# Patient Record
Sex: MALE | Race: WHITE | Employment: FULL TIME | URBAN - NONMETROPOLITAN AREA
[De-identification: names, ages, dates, MRNs, and addresses within clinical notes are randomized per-mention and may not be internally consistent; named-entity substitution may affect disease eponyms.]

---

## 2017-06-08 ENCOUNTER — HISTORY (OUTPATIENT)
Dept: FAMILY MEDICINE | Facility: OTHER | Age: 42
End: 2017-06-08

## 2017-06-08 ENCOUNTER — OFFICE VISIT - GICH (OUTPATIENT)
Dept: FAMILY MEDICINE | Facility: OTHER | Age: 42
End: 2017-06-08

## 2017-06-08 DIAGNOSIS — K64.0 FIRST DEGREE HEMORRHOIDS: ICD-10-CM

## 2017-06-08 ASSESSMENT — PATIENT HEALTH QUESTIONNAIRE - PHQ9: SUM OF ALL RESPONSES TO PHQ QUESTIONS 1-9: 0

## 2017-12-27 NOTE — PROGRESS NOTES
Patient Information     Patient Name MRN Sex Sunday Kaba [id number] Male 1975      Progress Notes by Jimenez Fournier MD at 2017 11:30 AM     Author:  Jimenez Fournier MD Service:  (none) Author Type:  Physician     Filed:  2017  7:53 AM Encounter Date:  2017 Status:  Signed     :  Jimeenz Fournier MD (Physician)            SUBJECTIVE:    Sunday Ramírez is a 42 y.o. male who presents for hemorrhoid    HPI Comments: Patient arrives here for rectal pain. States is been going on for couple days. He's been using Preparation H with short term relief      No Known Allergies,   Family History       Problem   Relation Age of Onset     Other  Mother      Emphyzema      and   No current outpatient prescriptions on file prior to visit.     No current facility-administered medications on file prior to visit.        REVIEW OF SYSTEMS:  ROS    OBJECTIVE:  /60  Temp 97.8  F (36.6  C) (Temporal)  Wt 115.8 kg (255 lb 6.4 oz)  BMI 36.65 kg/m2    EXAM:   Physical Exam   Constitutional: He is well-developed, well-nourished, and in no distress.   Abdominal:   Patient does have an external hemorrhoid that soft does not appear to be clotted. I do not feel any fluctuant mass that would be concerning for a rectal abscess   Skin: Skin is warm.       ASSESSMENT/PLAN:    ICD-10-CM    1. First degree hemorrhoids K64.0 hydrocortisone 2.5% cream        Plan:  Recommended hydrocortisone cream. If getting worse follow-up. Currently doubt a rectal abscess exists.

## 2017-12-30 NOTE — NURSING NOTE
Patient Information     Patient Name MRN Sunday Joseph [id number] Male 1975      Nursing Note by Taya Rice at 2017 11:30 AM     Author:  Taya Rice Service:  (none) Author Type:  (none)     Filed:  2017 11:24 AM Encounter Date:  2017 Status:  Signed     :  Taya Rice            Patient here for hemorrhoids that started on Monday. Taya Rice LPN .......................2017  11:21 AM

## 2018-01-25 ENCOUNTER — DOCUMENTATION ONLY (OUTPATIENT)
Dept: FAMILY MEDICINE | Facility: OTHER | Age: 43
End: 2018-01-25

## 2018-01-25 PROBLEM — F41.1 ANXIETY STATE: Status: ACTIVE | Noted: 2018-01-25

## 2018-01-25 PROBLEM — K64.0 FIRST DEGREE HEMORRHOIDS: Status: ACTIVE | Noted: 2017-06-08

## 2018-01-27 VITALS — DIASTOLIC BLOOD PRESSURE: 60 MMHG | SYSTOLIC BLOOD PRESSURE: 130 MMHG | TEMPERATURE: 97.8 F | WEIGHT: 255.4 LBS

## 2018-01-31 ASSESSMENT — PATIENT HEALTH QUESTIONNAIRE - PHQ9: SUM OF ALL RESPONSES TO PHQ QUESTIONS 1-9: 0

## 2018-02-14 ENCOUNTER — OFFICE VISIT (OUTPATIENT)
Dept: FAMILY MEDICINE | Facility: OTHER | Age: 43
End: 2018-02-14
Attending: FAMILY MEDICINE
Payer: COMMERCIAL

## 2018-02-14 VITALS
BODY MASS INDEX: 34.26 KG/M2 | DIASTOLIC BLOOD PRESSURE: 64 MMHG | SYSTOLIC BLOOD PRESSURE: 126 MMHG | HEART RATE: 80 BPM | WEIGHT: 238.8 LBS

## 2018-02-14 DIAGNOSIS — H90.0 CONDUCTIVE HEARING LOSS, BILATERAL: ICD-10-CM

## 2018-02-14 DIAGNOSIS — H93.13 TINNITUS OF BOTH EARS: Primary | ICD-10-CM

## 2018-02-14 PROBLEM — K64.0 FIRST DEGREE HEMORRHOIDS: Status: RESOLVED | Noted: 2017-06-08 | Resolved: 2018-02-14

## 2018-02-14 PROCEDURE — 99213 OFFICE O/P EST LOW 20 MIN: CPT | Performed by: FAMILY MEDICINE

## 2018-02-14 PROCEDURE — G0463 HOSPITAL OUTPT CLINIC VISIT: HCPCS

## 2018-02-14 NOTE — MR AVS SNAPSHOT
"              After Visit Summary   2/14/2018    Sunday Ramírez    MRN: [id number]           Patient Information     Date Of Birth          1975        Visit Information        Provider Department      2/14/2018 10:45 AM Jimenez Fournier MD [facility]        Today's Diagnoses     Tinnitus of both ears    -  1    Conductive hearing loss, bilateral           Follow-ups after your visit        Additional Services     AUDIOLOGY ADULT REFERRAL       Your provider has referred you to: per pt      Hearing loss and ringing                  Who to contact     If you have questions or need follow up information about today's clinic visit or your schedule please contact [facility] directly at [phone].  Normal or non-critical lab and imaging results will be communicated to you by Saiseihart, letter or phone within 4 business days after the clinic has received the results. If you do not hear from us within 7 days, please contact the clinic through Saiseihart or phone. If you have a critical or abnormal lab result, we will notify you by phone as soon as possible.  Submit refill requests through Viacore or call your pharmacy and they will forward the refill request to us. Please allow 3 business days for your refill to be completed.          Additional Information About Your Visit        MyChart Information     Viacore lets you send messages to your doctor, view your test results, renew your prescriptions, schedule appointments and more. To sign up, go to [email or web address] . Click on \"Log in\" on the left side of the screen, which will take you to the Welcome page. Then click on \"Sign up Now\" on the right side of the page.     You will be asked to enter the access code listed below, as well as some personal information. Please follow the directions to create your username and password.     Your access code is: [email or web address]  Expires: 5/15/2018 11:04 AM     Your access code will "  in 90 days. If you need help or a new code, please call your [facility] clinic or [phone].        Care EveryWhere ID     This is your Care EveryWhere ID. This could be used by other organizations to access your [facility] medical records  [id number]        Your Vitals Were     Pulse BMI (Body Mass Index)                80 34.26 kg/m2           Blood Pressure from Last 3 Encounters:   18 126/64   17 130/60   08/25/15 124/80    Weight from Last 3 Encounters:   18 238 lb 12.8 oz (108.3 kg)   17 255 lb 6.4 oz (115.8 kg)   08/25/15 254 lb (115.2 kg)              We Performed the Following     AUDIOLOGY ADULT REFERRAL        Primary Care Provider Office Phone # Fax #    [facility] [phone] [address]  [facility] [address]        Equal Access to Services     PRESLEY DOMÍNGUEZ : Hadii sylvain Garza, waaxda loki, qaybta kaalmada shama, ramiro villatoro . So [facility] [phone].    ATENCIÓN: Si maria luzla michelle, tiene a graham disposición servicios gratuitos de asistencia lingüística. Llame al [phone].    We comply with applicable federal civil rights laws and [place] laws. We do not discriminate on the basis of race, color, national origin, age, disability, sex, sexual orientation, or gender identity.            Thank you!     Thank you for choosing [facility] AND [facility]  for your care. Our goal is always to provide you with excellent care. Hearing back from our patients is one way we can continue to improve our services. Please take a few minutes to complete the written survey that you may receive in the mail after your visit with us. Thank you!             Your Updated Medication List - Protect others around you: Learn how to safely use, store and throw away your medicines at www.disposemymeds.org.      Notice  As of 2018 11:04 AM    You have not been prescribed any medications.

## 2018-02-14 NOTE — NURSING NOTE
Patient here for consult for referral for hearing aides. He has had hearing trouble and ringing in his ears since he was little. Taya Rice LPN .......................2/14/2018  10:42 AM  Audiology Screening  Right Ear Frequencies 500 and 1000 were heard at  20 dcbl   Left Ear Frequencies: 500 and 1000 were hears at  20 dcbl   Test performed by: Taya Rice LPN .......................2/14/2018  10:51 AM   on 2/14/2018

## 2018-02-14 NOTE — PROGRESS NOTES
SUBJECTIVE:   Sunday Ramírez is a 42 year old male who presents to clinic today for the following health issues:      HPI Comments: Pt reports ringing in ear for years  [place]  And exposed to loud noises       Problem list and histories reviewed & adjusted, as indicated.  Additional history: as documented        Patient Active Problem List   Diagnosis     Anxiety state     Right carpal tunnel syndrome     First degree hemorrhoids     Tobacco use disorder     Trigger finger, left     No past surgical history on file.    Social History   Substance Use Topics     Smoking status: Former Smoker     Packs/day: 1.00     Types: Cigarettes     Quit date: 1/1/2015     Smokeless tobacco: Current User     Types: Chew     Alcohol use No     Family History   Problem Relation Age of Onset     Other - See Comments Mother      Emphyzema         No current outpatient prescriptions on file.     No Known Allergies  Recent Labs   Lab Test  01/03/13   1214   CR  0.69*   GFRESTBLACK  >60        ROS:      OBJECTIVE:     /64  Pulse 80  Wt 238 lb 12.8 oz (108.3 kg)  BMI 34.26 kg/m2  Body mass index is 34.26 kg/(m^2).   GENERAL: healthy, alert and no distress  HENT: ear canals and TM's normal, nose and mouth without ulcers or lesions  MS: no gross musculoskeletal defects noted, no edema    none     ASSESSMENT:       PLAN:   1. Tinnitus of both ears    - AUDIOLOGY ADULT REFERRAL    2. Conductive hearing loss, bilateral                Jimenez Fournier MD  [facility]

## 2018-07-24 NOTE — PROGRESS NOTES
Patient Information     Patient Name  Sunday Ramírez MRN  [id number] Sex  Male   1975      Letter by Jimenez Fournier MD at      Author:  Jimenez Fournier MD Service:  (none) Author Type:  (none)    Filed:   Encounter Date:  2017 Status:  (Other)           Sunday Ramírez  [address]  [facility] [address]          2017      CERTIFICATE TO RETURN TO WORK OR SCHOOL      Sunday Ramírez was seen on  2017 and is able to return to work          Jimenez Fournier MD ....................  2017   11:34 AM

## 2019-02-12 ENCOUNTER — OFFICE VISIT (OUTPATIENT)
Dept: OTOLARYNGOLOGY | Facility: OTHER | Age: 44
End: 2019-02-12
Attending: OTOLARYNGOLOGY
Payer: COMMERCIAL

## 2019-02-12 DIAGNOSIS — H90.3 SENSORY HEARING LOSS, BILATERAL: Primary | ICD-10-CM

## 2019-02-12 PROCEDURE — G0463 HOSPITAL OUTPT CLINIC VISIT: HCPCS

## 2019-02-14 NOTE — PROGRESS NOTES
CATESHAHABY A    43 Y old Male, : 1975    Account Number: [id number]     , [address] [phone]    Home: [phone]     Guarantor: JONH ESPOSITO Insurance: [employer] MEDICAID Payer ID: [id number]   PCP: [facility]   Appointment Facility: [facility]      2019 Adrián Cowan MD       Reason for Appointment   1. HEARING EVALUATION   2. Hearing loss   History of Present Illness   HPI:   The patient is a 43-year-old male who comes to the office today for evaluation of his ears and hearing. 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 He has had no previous otologic surgery. He denies recurring ear infections. He has no history consistent with ototoxicity. He has a positive family history of hearing loss in his father and paternal grandfather. In addition to hearing loss, he complains of nearly continuous tinnitus.   Examination   General Examination:  External auditory canals and TMs are clear bilaterally   Remainder of the head neck exam is unremarkable   Audiogram-There is a significant high-frequency hearing loss bilaterally. The speech reception thresholds are 35 decibels. He has mildly depressed discrimination scores.     Assessments   1. Sensorineural hearing loss (SNHL) of both ears - H90.3 (Primary)   Treatment   1. Others   Notes: The patient was counseled that hearing aids would clearly be of some benefit to him. He was returned to our audiologist to discuss his options. He will follow up with me as needed.  Procedures  [ ].          Follow Up   prn               Electronically signed by ADRIÁN COWAN MD on 2019 at 08:21 AM CST   Sign off status: Completed          [facility]  [address] [address]  Tel: [phone]  Fax:           Patient: JONH ESPOSITO : 1975 Progress Note: Adrián Cowan MD 2019      Note generated by [employer] EMR/PM  Software ([email or web address])

## 2019-02-27 ENCOUNTER — HOSPITAL ENCOUNTER (OUTPATIENT)
Dept: CT IMAGING | Facility: HOSPITAL | Age: 44
Discharge: HOME | End: 2019-02-27
Attending: FAMILY MEDICINE | Admitting: FAMILY MEDICINE
Payer: COMMERCIAL

## 2019-02-27 DIAGNOSIS — R10.32 LLQ ABDOMINAL PAIN: ICD-10-CM

## 2019-02-27 PROCEDURE — 74177 CT ABD & PELVIS W/CONTRAST: CPT | Mod: TC

## 2019-02-27 PROCEDURE — 25500064 ZZH RX 255 OP 636: Performed by: RADIOLOGY

## 2019-02-27 RX ORDER — IOPAMIDOL 612 MG/ML
100 INJECTION, SOLUTION INTRAVASCULAR ONCE
Status: COMPLETED | OUTPATIENT
Start: 2019-02-27 | End: 2019-02-27

## 2019-02-27 RX ADMIN — IOPAMIDOL 100 ML: 612 INJECTION, SOLUTION INTRAVENOUS at 11:35

## 2019-02-27 RX ADMIN — DIATRIZOATE MEGLUMINE AND DIATRIZOATE SODIUM 30 ML: 660; 100 SOLUTION ORAL; RECTAL at 11:35

## 2019-03-12 ENCOUNTER — APPOINTMENT (OUTPATIENT)
Dept: OTOLARYNGOLOGY | Facility: OTHER | Age: 44
End: 2019-03-12
Attending: NURSE PRACTITIONER
Payer: COMMERCIAL

## 2019-03-25 ENCOUNTER — OFFICE VISIT (OUTPATIENT)
Dept: SURGERY | Facility: OTHER | Age: 44
End: 2019-03-25
Attending: SURGERY
Payer: COMMERCIAL

## 2019-03-25 VITALS
DIASTOLIC BLOOD PRESSURE: 76 MMHG | RESPIRATION RATE: 16 BRPM | TEMPERATURE: 97.2 F | SYSTOLIC BLOOD PRESSURE: 134 MMHG | BODY MASS INDEX: 38.51 KG/M2 | HEART RATE: 92 BPM | OXYGEN SATURATION: 97 % | WEIGHT: 268.4 LBS

## 2019-03-25 DIAGNOSIS — K52.9 COLITIS: Primary | ICD-10-CM

## 2019-03-25 PROCEDURE — 99204 OFFICE O/P NEW MOD 45 MIN: CPT | Performed by: SURGERY

## 2019-03-25 PROCEDURE — G0463 HOSPITAL OUTPT CLINIC VISIT: HCPCS

## 2019-03-25 RX ORDER — BISACODYL 5 MG
TABLET, DELAYED RELEASE (ENTERIC COATED) ORAL
Qty: 2 TABLET | Refills: 0 | Status: ON HOLD | OUTPATIENT
Start: 2019-03-25 | End: 2019-04-10

## 2019-03-25 RX ORDER — POLYETHYLENE GLYCOL 3350, SODIUM CHLORIDE, SODIUM BICARBONATE, POTASSIUM CHLORIDE 420; 11.2; 5.72; 1.48 G/4L; G/4L; G/4L; G/4L
4000 POWDER, FOR SOLUTION ORAL ONCE
Status: DISCONTINUED | OUTPATIENT
Start: 2019-03-25 | End: 2019-03-25

## 2019-03-25 RX ORDER — POLYETHYLENE GLYCOL 3350, SODIUM CHLORIDE, SODIUM BICARBONATE, POTASSIUM CHLORIDE 420; 11.2; 5.72; 1.48 G/4L; G/4L; G/4L; G/4L
4000 POWDER, FOR SOLUTION ORAL ONCE
Qty: 4000 ML | Refills: 0 | Status: ON HOLD | OUTPATIENT
Start: 2019-03-25 | End: 2019-04-10

## 2019-03-25 RX ORDER — BISACODYL 5 MG
5 TABLET, DELAYED RELEASE (ENTERIC COATED) ORAL DAILY PRN
Qty: 2 TABLET | Refills: 0 | Status: SHIPPED | OUTPATIENT
Start: 2019-03-25 | End: 2019-03-25

## 2019-03-25 ASSESSMENT — PAIN SCALES - GENERAL: PAINLEVEL: NO PAIN (0)

## 2019-03-25 NOTE — PROGRESS NOTES
GENERAL SURGERY CONSULTATION NOTE    Sunday Ramírez   [address]  [address]   [place] [address]  43 year old  male    Primary Care Provider:  Primitivo Maxwell      HPI: Sunday Ramírez presents to clinic with recent history of acute abdominal pain.  Patient was seen by his primary doctor in [place], CT scan was significant for colitis and the patient was treated with oral antibiotics for diagnosis of diverticulitis.  Patient said his symptoms improved with the antibiotic.  He is no longer taking the antibiotics.  He now only has some residual mild cramping.  His stools are soft and formed.  He denies constipation.  He was constipated when he had acute left-sided abdominal pain.  Patient denies diarrhea patient denies blood in his stools.  The patient's weight has been stable. Patient denies family history of colon cancer.  He is never had a colonoscopy.  Patient denies family history of inflammatory bowel disease such as Crohn's or ulcerative colitis.  He is now eating a regular diet, which she says includes a lots of processed foods and cheeses.  He does not take a fiber supplement.      REVIEW OF SYSTEMS:    GENERAL: No fevers or chills. Denies fatigue, recent weight loss.  HEENT: No sinus drainage. No changes with vision or hearing. No difficulty swallowing.   LYMPHATICS:  Noswollen nodes in axilla, neck or groin.  CARDIOVASCULAR: Denies chest pain, palpitations and dyspnea on exertion.  PULMONARY: No shortness of breath or cough. No increase in sputum production.  GI: Denies melena, bright red blood in stools. No hematemesis.  : No dysuria or hematuria.  SKIN: No recent rashes or ulcers.   HEMATOLOGY:  No history of easy bruising or bleeding.  ENDOCRINE:  No history of diabetes or thyroid problems.  NEUROLOGY:  No history of seizures or headaches. No motor or sensory changes.         Patient Active Problem List   Diagnosis     Anxiety state     Right carpal tunnel syndrome     Tobacco use disorder     Trigger finger,  left       Past Medical History:   Diagnosis Date     Injury of left forearm     04     Uncomplicated asthma     Hx of asthma       No past surgical history on file.    Family History   Problem Relation Age of Onset     Other - See Comments Mother         Emphyzema       Social History     Social History Narrative    . . Works in [employer].  Interested in mechanics, reading, fishing, running.  Last [place] .  Neg    <Preloaded 1/10/13       Social History     Socioeconomic History     Marital status:      Spouse name: Not on file     Number of children: Not on file     Years of education: Not on file     Highest education level: Not on file   Occupational History     Not on file   Social Needs     Financial resource strain: Not on file     Food insecurity:     Worry: Not on file     Inability: Not on file     Transportation needs:     Medical: Not on file     Non-medical: Not on file   Tobacco Use     Smoking status: Former Smoker     Packs/day: 1.00     Types: Cigarettes     Last attempt to quit: 2015     Years since quittin.2     Smokeless tobacco: Current User     Types: Chew   Substance and Sexual Activity     Alcohol use: No     Drug use: No     Types: Other     Comment: Drug use: No     Sexual activity: Not on file   Lifestyle     Physical activity:     Days per week: Not on file     Minutes per session: Not on file     Stress: Not on file   Relationships     Social connections:     Talks on phone: Not on file     Gets together: Not on file     Attends Restorationist service: Not on file     Active member of club or organization: Not on file     Attends meetings of clubs or organizations: Not on file     Relationship status: Not on file     Intimate partner violence:     Fear of current or ex partner: Not on file     Emotionally abused: Not on file     Physically abused: Not on file     Forced sexual activity: Not on file   Other Topics Concern     Parent/sibling w/ CABG, MI or  angioplasty before 65F 55M? Not Asked   Social History Narrative    . . Works in [employer].  Interested in mechanics, reading, fishing, running.  Last [employer] 2006.  Neg    <Preloaded 1/10/13         No current outpatient medications on file prior to visit.  No current facility-administered medications on file prior to visit.       ALLERGIES/SENSITIVITIES: No Known Allergies    PHYSICAL EXAM:     /76 (BP Location: Right arm, Patient Position: Sitting, Cuff Size: Adult Regular)   Pulse 92   Temp 97.2  F (36.2  C) (Temporal)   Resp 16   Wt 121.7 kg (268 lb 6.4 oz)   SpO2 97%   BMI 38.51 kg/m      General Appearance:   Sitting up in the chair, no apparent distress  HEENT: Pupils are equal and reactive, no scleral icterus, hearing aids are in place  Heart & CV:  RRR no murmur.  Intact distal pulses, good cap refill.  LUNGS: No increased work of breathing.Lugns are CTA B/L, no wheezing or crackles.  Abd: Obese abdomen, soft, nontender, nondistended.  No masses.  No evidence of umbilical or inguinal hernia.  Ext: Normal bulk and tone, no lower extremity edema  Neuro: Alert and oriented, normal speech and mentation    CT scan is significant for a discrete area of inflammation within the descending colon.  There is no evidence of diverticulosis in the area.  There is no evidence of diverticulosis in the sigmoid colon.  There is no evidence of lymphadenopathy in the area of inflammation.  There is no liver lesions.        CONSULTATION ASSESSMENT AND PLAN:    43 year old male with colitis on CT scan which is presumed diverticulitis that improved with oral antibiotics.  I do not see significant burden of diverticulosis on the CT scan however this is the most likely etiology overall.  Other sources of pain could be infectious colitis, inflammatory bowel disease, colon cancer.  For this reason I think colonoscopy is warranted.  The technical details of colonoscopy were discussed with the patient along  with the risks and benefits to include bleeding, perforation, incomplete study.  Patient demonstrated understanding and is willing to proceed.    Schedule colonoscopy at the patient's convenience.        Alexsander Tinoco MD on 3/25/2019 at 10:56 AM

## 2019-03-25 NOTE — H&P (VIEW-ONLY)
GENERAL SURGERY CONSULTATION NOTE    Sunday Ramírez   [address]   [place] [address]  43 year old  male    Primary Care Provider:  Primitivo Maxwell      HPI: Sunday Ramírez presents to clinic with recent history of acute abdominal pain.  Patient was seen by his primary doctor in [place], CT scan was significant for colitis and the patient was treated with oral antibiotics for diagnosis of diverticulitis.  Patient said his symptoms improved with the antibiotic.  He is no longer taking the antibiotics.  He now only has some residual mild cramping.  His stools are soft and formed.  He denies constipation.  He was constipated when he had acute left-sided abdominal pain.  Patient denies diarrhea patient denies blood in his stools.  The patient's weight has been stable. Patient denies family history of colon cancer.  He is never had a colonoscopy.  Patient denies family history of inflammatory bowel disease such as Crohn's or ulcerative colitis.  He is now eating a regular diet, which she says includes a lots of processed foods and cheeses.  He does not take a fiber supplement.      REVIEW OF SYSTEMS:    GENERAL: No fevers or chills. Denies fatigue, recent weight loss.  HEENT: No sinus drainage. No changes with vision or hearing. No difficulty swallowing.   LYMPHATICS:  Noswollen nodes in axilla, neck or groin.  CARDIOVASCULAR: Denies chest pain, palpitations and dyspnea on exertion.  PULMONARY: No shortness of breath or cough. No increase in sputum production.  GI: Denies melena, bright red blood in stools. No hematemesis.  : No dysuria or hematuria.  SKIN: No recent rashes or ulcers.   HEMATOLOGY:  No history of easy bruising or bleeding.  ENDOCRINE:  No history of diabetes or thyroid problems.  NEUROLOGY:  No history of seizures or headaches. No motor or sensory changes.         Patient Active Problem List   Diagnosis     Anxiety state     Right carpal tunnel syndrome     Tobacco use disorder     Trigger finger,  left       Past Medical History:   Diagnosis Date     Injury of left forearm     04     Uncomplicated asthma     Hx of asthma       No past surgical history on file.    Family History   Problem Relation Age of Onset     Other - See Comments Mother         Emphyzema       Social History     Social History Narrative    . . Works in [employer].  Interested in mechanics, reading, fishing, running.  Last [place] .  Neg    <Preloaded 1/10/13       Social History     Socioeconomic History     Marital status:      Spouse name: Not on file     Number of children: Not on file     Years of education: Not on file     Highest education level: Not on file   Occupational History     Not on file   Social Needs     Financial resource strain: Not on file     Food insecurity:     Worry: Not on file     Inability: Not on file     Transportation needs:     Medical: Not on file     Non-medical: Not on file   Tobacco Use     Smoking status: Former Smoker     Packs/day: 1.00     Types: Cigarettes     Last attempt to quit: 2015     Years since quittin.2     Smokeless tobacco: Current User     Types: Chew   Substance and Sexual Activity     Alcohol use: No     Drug use: No     Types: Other     Comment: Drug use: No     Sexual activity: Not on file   Lifestyle     Physical activity:     Days per week: Not on file     Minutes per session: Not on file     Stress: Not on file   Relationships     Social connections:     Talks on phone: Not on file     Gets together: Not on file     Attends Jehovah's witness service: Not on file     Active member of club or organization: Not on file     Attends meetings of clubs or organizations: Not on file     Relationship status: Not on file     Intimate partner violence:     Fear of current or ex partner: Not on file     Emotionally abused: Not on file     Physically abused: Not on file     Forced sexual activity: Not on file   Other Topics Concern     Parent/sibling w/ CABG, MI or  angioplasty before 65F 55M? Not Asked   Social History Narrative    . . Works in [employer].  Interested in mechanics, reading, fishing, running.  Last [place] 2006.  Neg    <Preloaded 1/10/13         No current outpatient medications on file prior to visit.  No current facility-administered medications on file prior to visit.       ALLERGIES/SENSITIVITIES: No Known Allergies    PHYSICAL EXAM:     /76 (BP Location: Right arm, Patient Position: Sitting, Cuff Size: Adult Regular)   Pulse 92   Temp 97.2  F (36.2  C) (Temporal)   Resp 16   Wt 121.7 kg (268 lb 6.4 oz)   SpO2 97%   BMI 38.51 kg/m      General Appearance:   Sitting up in the chair, no apparent distress  HEENT: Pupils are equal and reactive, no scleral icterus, hearing aids are in place  Heart & CV:  RRR no murmur.  Intact distal pulses, good cap refill.  LUNGS: No increased work of breathing.Lugns are CTA B/L, no wheezing or crackles.  Abd: Obese abdomen, soft, nontender, nondistended.  No masses.  No evidence of umbilical or inguinal hernia.  Ext: Normal bulk and tone, no lower extremity edema  Neuro: Alert and oriented, normal speech and mentation    CT scan is significant for a discrete area of inflammation within the descending colon.  There is no evidence of diverticulosis in the area.  There is no evidence of diverticulosis in the sigmoid colon.  There is no evidence of lymphadenopathy in the area of inflammation.  There is no liver lesions.        CONSULTATION ASSESSMENT AND PLAN:    43 year old male with colitis on CT scan which is presumed diverticulitis that improved with oral antibiotics.  I do not see significant burden of diverticulosis on the CT scan however this is the most likely etiology overall.  Other sources of pain could be infectious colitis, inflammatory bowel disease, colon cancer.  For this reason I think colonoscopy is warranted.  The technical details of colonoscopy were discussed with the patient along  with the risks and benefits to include bleeding, perforation, incomplete study.  Patient demonstrated understanding and is willing to proceed.    Schedule colonoscopy at the patient's convenience.        Alexsander Tinoco MD on 3/25/2019 at 10:56 AM

## 2019-03-25 NOTE — TELEPHONE ENCOUNTER
Screening Questions for the Scheduling of Screening Colonoscopies   (If Colonoscopy is diagnostic, Provider should review the chart before scheduling.)  Are you younger than 50 or older than 80?  YES   Do you take aspirin or fish oil?  NO  (if yes, tell patient to stop 1 week prior to Colonoscopy)  Do you take warfarin (Coumadin), clopidogrel (Plavix), apixaban (Eliquis), dabigatram (Pradaxa), rivaroxaban (Xarelto) or any blood thinner? NO   Do you use oxygen at home?  NO   Do you have kidney disease? NO   Are you on dialysis? NO   Have you had a stroke or heart attack in the last year? NO   Have you had a stent in your heart or any blood vessel in the last year? NO   Have you had a transplant of any organ? NO   Have you had a colonoscopy or upper endoscopy (EGD) before? NO          When?    Date of scheduled Colonoscopy. 04/10/2019  Provider  [facility]   Pharmacy [facility]

## 2019-04-10 ENCOUNTER — ANESTHESIA EVENT (OUTPATIENT)
Dept: SURGERY | Facility: OTHER | Age: 44
End: 2019-04-10
Payer: COMMERCIAL

## 2019-04-10 ENCOUNTER — HOSPITAL ENCOUNTER (OUTPATIENT)
Facility: OTHER | Age: 44
Discharge: HOME | End: 2019-04-10
Attending: SURGERY | Admitting: SURGERY
Payer: COMMERCIAL

## 2019-04-10 ENCOUNTER — ANESTHESIA (OUTPATIENT)
Dept: SURGERY | Facility: OTHER | Age: 44
End: 2019-04-10
Payer: COMMERCIAL

## 2019-04-10 VITALS
RESPIRATION RATE: 16 BRPM | WEIGHT: 268 LBS | DIASTOLIC BLOOD PRESSURE: 87 MMHG | TEMPERATURE: 97.5 F | HEIGHT: 70 IN | OXYGEN SATURATION: 97 % | BODY MASS INDEX: 38.37 KG/M2 | SYSTOLIC BLOOD PRESSURE: 128 MMHG | HEART RATE: 68 BPM

## 2019-04-10 DIAGNOSIS — K63.89 COLONIC MASS: Primary | ICD-10-CM

## 2019-04-10 LAB
ALBUMIN SERPL-MCNC: 3.7 G/DL (ref 3.5–5.7)
ALP SERPL-CCNC: 66 U/L (ref 34–104)
ALT SERPL W P-5'-P-CCNC: 27 U/L (ref 7–52)
ANION GAP SERPL CALCULATED.3IONS-SCNC: 6 MMOL/L (ref 3–14)
AST SERPL W P-5'-P-CCNC: 20 U/L (ref 13–39)
BILIRUB SERPL-MCNC: 0.4 MG/DL (ref 0.3–1)
BUN SERPL-MCNC: 16 MG/DL (ref 7–25)
CALCIUM SERPL-MCNC: 9.1 MG/DL (ref 8.6–10.3)
CEA SERPL-MCNC: <3 NG/ML
CHLORIDE SERPL-SCNC: 105 MMOL/L (ref 98–107)
CO2 SERPL-SCNC: 28 MMOL/L (ref 21–31)
CREAT SERPL-MCNC: 1.04 MG/DL (ref 0.7–1.3)
ERYTHROCYTE [DISTWIDTH] IN BLOOD BY AUTOMATED COUNT: 13.5 % (ref 10–15)
GFR SERPL CREATININE-BSD FRML MDRD: 78 ML/MIN/{1.73_M2}
GLUCOSE SERPL-MCNC: 112 MG/DL (ref 70–105)
HCT VFR BLD AUTO: 39.2 % (ref 40–53)
HGB BLD-MCNC: 12.1 G/DL (ref 13.3–17.7)
MCH RBC QN AUTO: 26.5 PG (ref 26.5–33)
MCHC RBC AUTO-ENTMCNC: 30.9 G/DL (ref 31.5–36.5)
MCV RBC AUTO: 86 FL (ref 78–100)
PLATELET # BLD AUTO: 285 10E9/L (ref 150–450)
POTASSIUM SERPL-SCNC: 4 MMOL/L (ref 3.5–5.1)
PROT SERPL-MCNC: 6.5 G/DL (ref 6.4–8.9)
RBC # BLD AUTO: 4.57 10E12/L (ref 4.4–5.9)
SODIUM SERPL-SCNC: 139 MMOL/L (ref 134–144)
WBC # BLD AUTO: 5.6 10E9/L (ref 4–11)

## 2019-04-10 PROCEDURE — 45380 COLONOSCOPY AND BIOPSY: CPT | Performed by: ANESTHESIOLOGY

## 2019-04-10 PROCEDURE — 88341 IMHCHEM/IMCYTCHM EA ADD ANTB: CPT

## 2019-04-10 PROCEDURE — 45380 COLONOSCOPY AND BIOPSY: CPT | Performed by: NURSE ANESTHETIST, CERTIFIED REGISTERED

## 2019-04-10 PROCEDURE — 25000128 H RX IP 250 OP 636: Performed by: NURSE ANESTHETIST, CERTIFIED REGISTERED

## 2019-04-10 PROCEDURE — 25000125 ZZHC RX 250: Performed by: NURSE ANESTHETIST, CERTIFIED REGISTERED

## 2019-04-10 PROCEDURE — 25800030 ZZH RX IP 258 OP 636: Performed by: SURGERY

## 2019-04-10 PROCEDURE — 45380 COLONOSCOPY AND BIOPSY: CPT | Performed by: SURGERY

## 2019-04-10 PROCEDURE — 25000125 ZZHC RX 250: Performed by: SURGERY

## 2019-04-10 PROCEDURE — 88305 TISSUE EXAM BY PATHOLOGIST: CPT

## 2019-04-10 PROCEDURE — 45381 COLONOSCOPY SUBMUCOUS NJX: CPT | Performed by: SURGERY

## 2019-04-10 PROCEDURE — 40000010 ZZH STATISTIC ANES STAT CODE-CRNA PER MINUTE: Performed by: SURGERY

## 2019-04-10 PROCEDURE — 45381 COLONOSCOPY SUBMUCOUS NJX: CPT

## 2019-04-10 PROCEDURE — 82378 CARCINOEMBRYONIC ANTIGEN: CPT | Performed by: SURGERY

## 2019-04-10 PROCEDURE — 36415 COLL VENOUS BLD VENIPUNCTURE: CPT | Performed by: SURGERY

## 2019-04-10 PROCEDURE — 80053 COMPREHEN METABOLIC PANEL: CPT | Performed by: SURGERY

## 2019-04-10 PROCEDURE — 25000132 ZZH RX MED GY IP 250 OP 250 PS 637: Performed by: SURGERY

## 2019-04-10 PROCEDURE — 85027 COMPLETE CBC AUTOMATED: CPT | Performed by: SURGERY

## 2019-04-10 PROCEDURE — 88342 IMHCHEM/IMCYTCHM 1ST ANTB: CPT

## 2019-04-10 RX ORDER — LIDOCAINE 40 MG/G
CREAM TOPICAL
Status: DISCONTINUED | OUTPATIENT
Start: 2019-04-10 | End: 2019-04-10 | Stop reason: HOSPADM

## 2019-04-10 RX ORDER — PROPOFOL 10 MG/ML
INJECTION, EMULSION INTRAVENOUS PRN
Status: DISCONTINUED | OUTPATIENT
Start: 2019-04-10 | End: 2019-04-10

## 2019-04-10 RX ORDER — LIDOCAINE HYDROCHLORIDE 20 MG/ML
INJECTION, SOLUTION INFILTRATION; PERINEURAL PRN
Status: DISCONTINUED | OUTPATIENT
Start: 2019-04-10 | End: 2019-04-10

## 2019-04-10 RX ORDER — SODIUM CHLORIDE, SODIUM LACTATE, POTASSIUM CHLORIDE, CALCIUM CHLORIDE 600; 310; 30; 20 MG/100ML; MG/100ML; MG/100ML; MG/100ML
INJECTION, SOLUTION INTRAVENOUS CONTINUOUS
Status: DISCONTINUED | OUTPATIENT
Start: 2019-04-10 | End: 2019-04-10 | Stop reason: HOSPADM

## 2019-04-10 RX ORDER — PROPOFOL 10 MG/ML
INJECTION, EMULSION INTRAVENOUS CONTINUOUS PRN
Status: DISCONTINUED | OUTPATIENT
Start: 2019-04-10 | End: 2019-04-10

## 2019-04-10 RX ORDER — SIMETHICONE
LIQUID (ML) MISCELLANEOUS PRN
Status: DISCONTINUED | OUTPATIENT
Start: 2019-04-10 | End: 2019-04-10 | Stop reason: HOSPADM

## 2019-04-10 RX ADMIN — SODIUM CHLORIDE, SODIUM LACTATE, POTASSIUM CHLORIDE, AND CALCIUM CHLORIDE: 600; 310; 30; 20 INJECTION, SOLUTION INTRAVENOUS at 07:12

## 2019-04-10 RX ADMIN — PROPOFOL 120 MG: 10 INJECTION, EMULSION INTRAVENOUS at 07:34

## 2019-04-10 RX ADMIN — LIDOCAINE HYDROCHLORIDE 40 MG: 20 INJECTION, SOLUTION INFILTRATION; PERINEURAL at 07:34

## 2019-04-10 RX ADMIN — PROPOFOL 160 MCG/KG/MIN: 10 INJECTION, EMULSION INTRAVENOUS at 07:34

## 2019-04-10 ASSESSMENT — MIFFLIN-ST. JEOR: SCORE: 2111.89

## 2019-04-10 ASSESSMENT — LIFESTYLE VARIABLES: TOBACCO_USE: 1

## 2019-04-10 NOTE — DISCHARGE INSTRUCTIONS
[facility]  Adult Discharge Orders & Instructions    ________________________________________________________________          For 12 hours after surgery  1. Get plenty of rest.  A responsible adult must stay with you for at least 24 hours after you leave the hospital.   2. You may feel lightheaded.  IF so, sit for a few minutes before standing.  Have someone help you get up.   3. You may have a slight fever. Call the doctor if your fever is over 101 F (38.3 C) (taken under the tongue) or lasts longer than 24 hours.  4. You may have a dry mouth, a sore throat, muscle aches or trouble sleeping.  These should go away after 24 hours.  5. Do not make important or legal decisions.      To contact a doctor, call   [phone]_______________________

## 2019-04-10 NOTE — INTERVAL H&P NOTE
I saw and examined Sundaylorie Ramírez.  I have reviewed the history and physical and find no changes to the patient's medical status or condition with the exceptions noted below.       Alexsander Tinoco   7:00 AM 4/10/2019

## 2019-04-10 NOTE — ANESTHESIA POSTPROCEDURE EVALUATION
Patient: Sunday Ramírez    Procedure(s):  COMBINED COLONOSCOPY, SINGLE OR MULTIPLE BIOPSY/POLYPECTOMY BY BIOPSY WITH SUBMUCOSAL INJECTION/TATTOO    Diagnosis:colitits  Diagnosis Additional Information: No value filed.    Anesthesia Type:  MAC    Note:  Anesthesia Post Evaluation    Patient location during evaluation: Phase 2  Patient participation: Able to fully participate in evaluation  Level of consciousness: awake and alert  Pain management: adequate  Airway patency: patent  Cardiovascular status: acceptable  Respiratory status: acceptable  Hydration status: acceptable  PONV: none     Anesthetic complications: None          Last vitals:  Vitals:    04/10/19 0659 04/10/19 0702 04/10/19 0840   BP:  (!) 135/93 120/72   Pulse:  81 78   Resp:  18 16   Temp: 97.7  F (36.5  C)  97.5  F (36.4  C)   SpO2:  96%          Electronically Signed By: Corby Goff DO  April 10, 2019  9:08 AM

## 2019-04-10 NOTE — OP NOTE
PROCEDURE NOTE    DATE OF SERVICE: 4/10/2019    SURGEON: JEMIMA Tinoco MD     PRE-OP DIAGNOSIS:    Colitis on CT scan       POST-OP DIAGNOSIS:    Sigmoid colon mass  Polyps at rectum    PROCEDURE:   Colonoscopy with biopsy and tattoo    ASSISTANT:  Circulator: Ariella Robin RN; Amira Mejia RN  Relief Circulator: Landon Mccauley RN  Scrub Person: Pam So  2nd Scrub: Kate Centeno  Pre-Op Nurse: Serenity Morales RN    ANESTHESIA:  MAC                            Monitor Anesthesia CareAnesthesiologist: Corby Goff DO  CRNA: Renan Schwab APRN CRNA    INDICATION FOR THE PROCEDURE: The patient is a 43 year old male with left lower quadrant pain that responded to antibiotics, presumed diverticulitis. The patient has no other complaints.  After explaining the risks to include bleeding, perforation, potential inability to reach the cecum the patient wishes to proceed.    PROCEDURE:After adequate sedation, the patient was in the left lateral decubitus position.  Rectal exam was performed.  There was normal tone and no palpable masses.  The colonoscope was introduced into the rectum and advanced to the sigmoid colon where a circumfrential mass was encountered. Biopsies were taken of the mass. The normal colonoscope would not pass the mass. A peds colonoscope was able to be passed through the narrowing caused by the mass. The cecum was reached with marked difficulty. We had to go back to the normal colonoscope to reach the cecum. Presumably the peds scope dilated the stricture enough to allow the adult colonoscope through.    The patient's prep was good.  The terminal cecum was reached.  The cecum, ascending, transverse, descending and sigmoid colon were significant for sigmoid mass that was biopsied with cold forceps and tattooed proximally and distally, and one small rectal polyp removed with cold forceps.  The scope was retroflexed in the rectum.  The distal rectum was unremarkable.  The scope  was straightened and removed.  The patient tolerated the procedure well.     ESTIMATED BLOOD LOSS: none    COMPLICATIONS:  None    TISSUE REMOVED:  Yes    RECOMMEND:    Colon resection for presumed cancer   CT scan is done, labs will be ordered including CBC, CMP, CEA      JEMIMA Tinoco MD

## 2019-04-10 NOTE — ANESTHESIA CARE TRANSFER NOTE
Patient: Sunday Ramírez    Procedure(s):  COMBINED COLONOSCOPY, SINGLE OR MULTIPLE BIOPSY/POLYPECTOMY BY BIOPSY WITH SUBMUCOSAL INJECTION/TATTOO    Diagnosis: colitits  Diagnosis Additional Information: No value filed.    Anesthesia Type:   MAC     Note:  Airway :Nasal Cannula  Patient transferred to:Phase II  Handoff Report: Identifed the Patient, Identified the Reponsible Provider, Reviewed the pertinent medical history, Discussed the surgical course, Reviewed Intra-OP anesthesia mangement and issues during anesthesia, Set expectations for post-procedure period and Allowed opportunity for questions and acknowledgement of understanding      Vitals: (Last set prior to Anesthesia Care Transfer)    CRNA VITALS  4/10/2019 0807 - 4/10/2019 0850      4/10/2019             Resp Rate (set):  10                Electronically Signed By: VITALIY Connor CRNA  April 10, 2019  8:50 AM

## 2019-04-11 ENCOUNTER — TELEPHONE (OUTPATIENT)
Dept: SURGERY | Facility: OTHER | Age: 44
End: 2019-04-11

## 2019-04-15 ENCOUNTER — OFFICE VISIT (OUTPATIENT)
Dept: SURGERY | Facility: OTHER | Age: 44
End: 2019-04-15
Attending: SURGERY
Payer: COMMERCIAL

## 2019-04-15 VITALS
HEART RATE: 82 BPM | WEIGHT: 268.2 LBS | BODY MASS INDEX: 38.39 KG/M2 | SYSTOLIC BLOOD PRESSURE: 122 MMHG | HEIGHT: 70 IN | TEMPERATURE: 97.5 F | DIASTOLIC BLOOD PRESSURE: 88 MMHG | RESPIRATION RATE: 20 BRPM

## 2019-04-15 DIAGNOSIS — C18.7 MALIGNANT NEOPLASM OF SIGMOID COLON (H): Primary | ICD-10-CM

## 2019-04-15 PROCEDURE — 99214 OFFICE O/P EST MOD 30 MIN: CPT | Performed by: SURGERY

## 2019-04-15 PROCEDURE — G0463 HOSPITAL OUTPT CLINIC VISIT: HCPCS

## 2019-04-15 RX ORDER — BISACODYL 5 MG
5 TABLET, DELAYED RELEASE (ENTERIC COATED) ORAL DAILY PRN
Qty: 2 TABLET | Refills: 0 | Status: ON HOLD | OUTPATIENT
Start: 2019-04-15 | End: 2019-04-23

## 2019-04-15 RX ORDER — POLYETHYLENE GLYCOL 3350, SODIUM CHLORIDE, SODIUM BICARBONATE, POTASSIUM CHLORIDE 420; 11.2; 5.72; 1.48 G/4L; G/4L; G/4L; G/4L
4000 POWDER, FOR SOLUTION ORAL ONCE
Status: DISCONTINUED | OUTPATIENT
Start: 2019-04-15 | End: 2019-04-23

## 2019-04-15 RX ORDER — NEOMYCIN SULFATE 500 MG/1
1000 TABLET ORAL SEE ADMIN INSTRUCTIONS
Qty: 6 TABLET | Refills: 0 | Status: ON HOLD | OUTPATIENT
Start: 2019-04-15 | End: 2019-04-23

## 2019-04-15 RX ORDER — METRONIDAZOLE 500 MG/1
500 TABLET ORAL SEE ADMIN INSTRUCTIONS
Qty: 3 TABLET | Refills: 0 | Status: ON HOLD | OUTPATIENT
Start: 2019-04-15 | End: 2019-04-23

## 2019-04-15 ASSESSMENT — PAIN SCALES - GENERAL: PAINLEVEL: NO PAIN (0)

## 2019-04-15 ASSESSMENT — MIFFLIN-ST. JEOR: SCORE: 2112.8

## 2019-04-15 NOTE — PROGRESS NOTES
"Sunday Ramírez presents to clinic for follow-up after undergoing a colonoscopy which revealed a circumferential colon mass.  The mass was tattooed at the time of colonoscopy and biopsies were taken.  Biopsies have returned in they indeed show adenocarcinoma.  Patient has felt fine since last week.  He denies obstructive symptoms, ongoing rectal bleeding, thin caliber stools, constipation.  He has been eating and drinking normal diet and he feels like his energy is good.  Quite worried about the diagnosis, so was his wife.  Patient denies family history of colon cancer.  He denies family history of ulcerative colitis or Crohn's.  He denies significant family history of cancer at all.     /88 (BP Location: Right arm, Patient Position: Sitting, Cuff Size: Adult Large)   Pulse 82   Temp 97.5  F (36.4  C) (Temporal)   Resp 20   Ht 1.778 m (5' 10\")   Wt 121.7 kg (268 lb 3.2 oz)   BMI 38.48 kg/m      Patient is sitting up in a chair, appears slightly anxious.  No increased work of breathing, lungs are clear to auscultation bilaterally  Heart is regular rate and rhythm, no murmur  Abdomen is obese, soft, nontender, nondistended, no masses felt.  Normal bulk and tone in upper and lower extremities bilaterally.  No lower extremity edema  Alert and oriented, normal speech mentation    Labs were drawn after colonoscopy last week, 4/10/2019, and the are significant for creatinine 1.04, albumin 3.7, alk phos 66, ALT 27, AST 20, glucose 112, WBC 5.6, hemoglobin 12.1, platelets 285, CEA is less than 3.     Pathology results show low-grade adenocarcinoma that is negative for DNA mismatch repair defects, indicating negative for Curtis syndrome.      Sunday Ramírez is a 43-year-old gentleman with biopsy confirmed colon cancer in the sigmoid colon.  Clinically T2 N0 M0, stage I.  The technical details of laparoscopic assisted sigmoid colectomy with primary anastomosis, possible colostomy discussed with the patient along with " the risks and benefits to include bleeding, infection, cancer recurrence, anastomotic leak, need for temporary or permanent colostomy, and injury to surrounding structures including ureters, small intestine, spleen.  The patient demonstrated understanding and is willing to proceed.    - Schedule the patient for laparoscopic assisted sigmoid colectomy with primary anastomosis  - Bowel prep with oral antibiotics will be ordered for the patient prior to surgery.      JEMIMA Tinoco MD

## 2019-04-15 NOTE — NURSING NOTE
"Chief Complaint   Patient presents with     Surgical Followup     colonoscopy and consult       Initial /88 (BP Location: Right arm, Patient Position: Sitting, Cuff Size: Adult Large)   Pulse 82   Temp 97.5  F (36.4  C) (Temporal)   Resp 20   Ht 1.778 m (5' 10\")   Wt 121.7 kg (268 lb 3.2 oz)   BMI 38.48 kg/m   Estimated body mass index is 38.48 kg/m  as calculated from the following:    Height as of this encounter: 1.778 m (5' 10\").    Weight as of this encounter: 121.7 kg (268 lb 3.2 oz).  Medication Reconciliation: complete    Wen Bahena LPN  "

## 2019-04-22 ENCOUNTER — TELEPHONE (OUTPATIENT)
Dept: SURGERY | Facility: OTHER | Age: 44
End: 2019-04-22

## 2019-04-22 ENCOUNTER — ANESTHESIA EVENT (OUTPATIENT)
Dept: SURGERY | Facility: OTHER | Age: 44
End: 2019-04-22
Payer: COMMERCIAL

## 2019-04-22 DIAGNOSIS — C18.7 MALIGNANT NEOPLASM OF SIGMOID COLON (H): Primary | ICD-10-CM

## 2019-04-22 RX ORDER — POLYETHYLENE GLYCOL 3350, SODIUM CHLORIDE, SODIUM BICARBONATE, POTASSIUM CHLORIDE 420; 11.2; 5.72; 1.48 G/4L; G/4L; G/4L; G/4L
4000 POWDER, FOR SOLUTION ORAL ONCE
Status: DISCONTINUED | OUTPATIENT
Start: 2019-04-22 | End: 2019-04-23

## 2019-04-22 RX ORDER — POLYETHYLENE GLYCOL 3350, SODIUM CHLORIDE, SODIUM BICARBONATE, POTASSIUM CHLORIDE 420; 11.2; 5.72; 1.48 G/4L; G/4L; G/4L; G/4L
4000 POWDER, FOR SOLUTION ORAL ONCE
Status: ON HOLD | COMMUNITY
End: 2019-04-23

## 2019-04-22 RX ORDER — CEFOXITIN 2 G/1
2 INJECTION, POWDER, FOR SOLUTION INTRAVENOUS
Status: DISCONTINUED | OUTPATIENT
Start: 2019-04-22 | End: 2019-04-22 | Stop reason: CLARIF

## 2019-04-22 NOTE — TELEPHONE ENCOUNTER
[facility] pharmacy in [place] contacted. They stated they did not receive the Rx for the Nulytlye.   I spoke directly to the pharmacist and gave a verbal order for the RX so that the patient would be able to start his prep tonight.  Pharmacy verbalized understanding and read back order.     Mary Anne Higginbotham LPN on 4/22/2019 at 1:52 PM  .

## 2019-04-23 ENCOUNTER — ANESTHESIA (OUTPATIENT)
Dept: SURGERY | Facility: OTHER | Age: 44
End: 2019-04-23
Payer: COMMERCIAL

## 2019-04-23 ENCOUNTER — HOSPITAL ENCOUNTER (INPATIENT)
Facility: OTHER | Age: 44
LOS: 4 days | Discharge: HOME | End: 2019-04-27
Attending: SURGERY | Admitting: SURGERY
Payer: COMMERCIAL

## 2019-04-23 PROBLEM — C18.9 COLON CANCER (H): Status: ACTIVE | Noted: 2019-04-23

## 2019-04-23 LAB
CREAT SERPL-MCNC: 0.87 MG/DL (ref 0.7–1.3)
GFR SERPL CREATININE-BSD FRML MDRD: >90 ML/MIN/{1.73_M2}
PLATELET # BLD AUTO: 331 10E9/L (ref 150–450)

## 2019-04-23 PROCEDURE — 25000128 H RX IP 250 OP 636: Performed by: NURSE ANESTHETIST, CERTIFIED REGISTERED

## 2019-04-23 PROCEDURE — 0WBF0ZZ EXCISION OF ABDOMINAL WALL, OPEN APPROACH: ICD-10-PCS | Performed by: SURGERY

## 2019-04-23 PROCEDURE — 25000128 H RX IP 250 OP 636: Performed by: SURGERY

## 2019-04-23 PROCEDURE — 0DTG0ZZ RESECTION OF LEFT LARGE INTESTINE, OPEN APPROACH: ICD-10-PCS | Performed by: SURGERY

## 2019-04-23 PROCEDURE — 40000306 ZZH STATISTIC PRE PROC ASSESS II: Performed by: SURGERY

## 2019-04-23 PROCEDURE — 36000093 ZZH SURGERY LEVEL 4 1ST 30 MIN: Performed by: SURGERY

## 2019-04-23 PROCEDURE — 27210794 ZZH OR GENERAL SUPPLY STERILE: Performed by: SURGERY

## 2019-04-23 PROCEDURE — 44213 LAP MOBIL SPLENIC FL ADD-ON: CPT | Performed by: SURGERY

## 2019-04-23 PROCEDURE — 25000125 ZZHC RX 250: Performed by: SURGERY

## 2019-04-23 PROCEDURE — 25000125 ZZHC RX 250: Performed by: NURSE ANESTHETIST, CERTIFIED REGISTERED

## 2019-04-23 PROCEDURE — 25000132 ZZH RX MED GY IP 250 OP 250 PS 637: Performed by: SURGERY

## 2019-04-23 PROCEDURE — 37000009 ZZH ANESTHESIA TECHNICAL FEE, EACH ADDTL 15 MIN: Performed by: SURGERY

## 2019-04-23 PROCEDURE — 36415 COLL VENOUS BLD VENIPUNCTURE: CPT | Performed by: SURGERY

## 2019-04-23 PROCEDURE — 25000564 ZZH DESFLURANE, EA 15 MIN: Performed by: SURGERY

## 2019-04-23 PROCEDURE — 85049 AUTOMATED PLATELET COUNT: CPT | Performed by: SURGERY

## 2019-04-23 PROCEDURE — 82565 ASSAY OF CREATININE: CPT | Performed by: SURGERY

## 2019-04-23 PROCEDURE — 44204 LAPARO PARTIAL COLECTOMY: CPT | Performed by: SURGERY

## 2019-04-23 PROCEDURE — 44204 LAPARO PARTIAL COLECTOMY: CPT | Performed by: NURSE ANESTHETIST, CERTIFIED REGISTERED

## 2019-04-23 PROCEDURE — 88305 TISSUE EXAM BY PATHOLOGIST: CPT

## 2019-04-23 PROCEDURE — 71000014 ZZH RECOVERY PHASE 1 LEVEL 2 FIRST HR: Performed by: SURGERY

## 2019-04-23 PROCEDURE — 25800030 ZZH RX IP 258 OP 636: Performed by: SURGERY

## 2019-04-23 PROCEDURE — 0DNG4ZZ RELEASE LEFT LARGE INTESTINE, PERCUTANEOUS ENDOSCOPIC APPROACH: ICD-10-PCS | Performed by: SURGERY

## 2019-04-23 PROCEDURE — 37000008 ZZH ANESTHESIA TECHNICAL FEE, 1ST 30 MIN: Performed by: SURGERY

## 2019-04-23 PROCEDURE — 88309 TISSUE EXAM BY PATHOLOGIST: CPT

## 2019-04-23 PROCEDURE — 12000000 ZZH R&B MED SURG/OB

## 2019-04-23 PROCEDURE — 36000063 ZZH SURGERY LEVEL 4 EA 15 ADDTL MIN: Performed by: SURGERY

## 2019-04-23 RX ORDER — ACETAMINOPHEN 325 MG/1
975 TABLET ORAL EVERY 8 HOURS
Status: COMPLETED | OUTPATIENT
Start: 2019-04-23 | End: 2019-04-26

## 2019-04-23 RX ORDER — DEXAMETHASONE SODIUM PHOSPHATE 4 MG/ML
INJECTION, SOLUTION INTRA-ARTICULAR; INTRALESIONAL; INTRAMUSCULAR; INTRAVENOUS; SOFT TISSUE PRN
Status: DISCONTINUED | OUTPATIENT
Start: 2019-04-23 | End: 2019-04-23

## 2019-04-23 RX ORDER — SODIUM CHLORIDE, SODIUM LACTATE, POTASSIUM CHLORIDE, CALCIUM CHLORIDE 600; 310; 30; 20 MG/100ML; MG/100ML; MG/100ML; MG/100ML
INJECTION, SOLUTION INTRAVENOUS CONTINUOUS
Status: DISCONTINUED | OUTPATIENT
Start: 2019-04-23 | End: 2019-04-24

## 2019-04-23 RX ORDER — SODIUM CHLORIDE, SODIUM LACTATE, POTASSIUM CHLORIDE, CALCIUM CHLORIDE 600; 310; 30; 20 MG/100ML; MG/100ML; MG/100ML; MG/100ML
INJECTION, SOLUTION INTRAVENOUS CONTINUOUS
Status: DISCONTINUED | OUTPATIENT
Start: 2019-04-23 | End: 2019-04-23 | Stop reason: HOSPADM

## 2019-04-23 RX ORDER — KETAMINE HYDROCHLORIDE 50 MG/ML
INJECTION, SOLUTION INTRAMUSCULAR; INTRAVENOUS PRN
Status: DISCONTINUED | OUTPATIENT
Start: 2019-04-23 | End: 2019-04-23

## 2019-04-23 RX ORDER — ACETAMINOPHEN 325 MG/1
975 TABLET ORAL ONCE
Status: COMPLETED | OUTPATIENT
Start: 2019-04-23 | End: 2019-04-23

## 2019-04-23 RX ORDER — ONDANSETRON 4 MG/1
4 TABLET, ORALLY DISINTEGRATING ORAL EVERY 30 MIN PRN
Status: DISCONTINUED | OUTPATIENT
Start: 2019-04-23 | End: 2019-04-23 | Stop reason: HOSPADM

## 2019-04-23 RX ORDER — PROCHLORPERAZINE MALEATE 10 MG
10 TABLET ORAL EVERY 6 HOURS PRN
Status: DISCONTINUED | OUTPATIENT
Start: 2019-04-23 | End: 2019-04-27 | Stop reason: HOSPADM

## 2019-04-23 RX ORDER — NALOXONE HYDROCHLORIDE 0.4 MG/ML
.1-.4 INJECTION, SOLUTION INTRAMUSCULAR; INTRAVENOUS; SUBCUTANEOUS
Status: DISCONTINUED | OUTPATIENT
Start: 2019-04-23 | End: 2019-04-23

## 2019-04-23 RX ORDER — ONDANSETRON 2 MG/ML
INJECTION INTRAMUSCULAR; INTRAVENOUS PRN
Status: DISCONTINUED | OUTPATIENT
Start: 2019-04-23 | End: 2019-04-23

## 2019-04-23 RX ORDER — GLYCOPYRROLATE 0.2 MG/ML
INJECTION, SOLUTION INTRAMUSCULAR; INTRAVENOUS PRN
Status: DISCONTINUED | OUTPATIENT
Start: 2019-04-23 | End: 2019-04-23

## 2019-04-23 RX ORDER — KETOROLAC TROMETHAMINE 30 MG/ML
30 INJECTION, SOLUTION INTRAMUSCULAR; INTRAVENOUS EVERY 6 HOURS
Status: COMPLETED | OUTPATIENT
Start: 2019-04-23 | End: 2019-04-24

## 2019-04-23 RX ORDER — KETOROLAC TROMETHAMINE 30 MG/ML
30 INJECTION, SOLUTION INTRAMUSCULAR; INTRAVENOUS ONCE
Status: COMPLETED | OUTPATIENT
Start: 2019-04-23 | End: 2019-04-23

## 2019-04-23 RX ORDER — LIDOCAINE 40 MG/G
CREAM TOPICAL
Status: DISCONTINUED | OUTPATIENT
Start: 2019-04-23 | End: 2019-04-27 | Stop reason: HOSPADM

## 2019-04-23 RX ORDER — OXYCODONE HYDROCHLORIDE 5 MG/1
5-10 TABLET ORAL
Status: DISCONTINUED | OUTPATIENT
Start: 2019-04-23 | End: 2019-04-27 | Stop reason: HOSPADM

## 2019-04-23 RX ORDER — MORPHINE SULFATE 0.5 MG/ML
INJECTION, SOLUTION EPIDURAL; INTRATHECAL; INTRAVENOUS PRN
Status: DISCONTINUED | OUTPATIENT
Start: 2019-04-23 | End: 2019-04-23

## 2019-04-23 RX ORDER — ACETAMINOPHEN 500 MG
500-1000 TABLET ORAL EVERY 6 HOURS PRN
COMMUNITY

## 2019-04-23 RX ORDER — IBUPROFEN 200 MG
200 TABLET ORAL EVERY 6 HOURS PRN
COMMUNITY

## 2019-04-23 RX ORDER — FENTANYL CITRATE 50 UG/ML
INJECTION, SOLUTION INTRAMUSCULAR; INTRAVENOUS PRN
Status: DISCONTINUED | OUTPATIENT
Start: 2019-04-23 | End: 2019-04-23

## 2019-04-23 RX ORDER — ONDANSETRON 2 MG/ML
4 INJECTION INTRAMUSCULAR; INTRAVENOUS EVERY 6 HOURS PRN
Status: DISCONTINUED | OUTPATIENT
Start: 2019-04-23 | End: 2019-04-27 | Stop reason: HOSPADM

## 2019-04-23 RX ORDER — LIDOCAINE 40 MG/G
CREAM TOPICAL
Status: DISCONTINUED | OUTPATIENT
Start: 2019-04-23 | End: 2019-04-23 | Stop reason: HOSPADM

## 2019-04-23 RX ORDER — ONDANSETRON 2 MG/ML
4 INJECTION INTRAMUSCULAR; INTRAVENOUS EVERY 30 MIN PRN
Status: DISCONTINUED | OUTPATIENT
Start: 2019-04-23 | End: 2019-04-23 | Stop reason: HOSPADM

## 2019-04-23 RX ORDER — NEOSTIGMINE METHYLSULFATE 1 MG/ML
VIAL (ML) INJECTION PRN
Status: DISCONTINUED | OUTPATIENT
Start: 2019-04-23 | End: 2019-04-23

## 2019-04-23 RX ORDER — ONDANSETRON 4 MG/1
4 TABLET, ORALLY DISINTEGRATING ORAL EVERY 6 HOURS PRN
Status: DISCONTINUED | OUTPATIENT
Start: 2019-04-23 | End: 2019-04-27 | Stop reason: HOSPADM

## 2019-04-23 RX ORDER — HYDROMORPHONE HYDROCHLORIDE 1 MG/ML
.3-.5 INJECTION, SOLUTION INTRAMUSCULAR; INTRAVENOUS; SUBCUTANEOUS
Status: DISCONTINUED | OUTPATIENT
Start: 2019-04-23 | End: 2019-04-27 | Stop reason: HOSPADM

## 2019-04-23 RX ORDER — LIDOCAINE HYDROCHLORIDE 20 MG/ML
INJECTION, SOLUTION INFILTRATION; PERINEURAL PRN
Status: DISCONTINUED | OUTPATIENT
Start: 2019-04-23 | End: 2019-04-23

## 2019-04-23 RX ORDER — FENTANYL CITRATE 50 UG/ML
25-50 INJECTION, SOLUTION INTRAMUSCULAR; INTRAVENOUS
Status: DISCONTINUED | OUTPATIENT
Start: 2019-04-23 | End: 2019-04-23 | Stop reason: HOSPADM

## 2019-04-23 RX ORDER — ACETAMINOPHEN 325 MG/1
650 TABLET ORAL EVERY 4 HOURS PRN
Status: DISCONTINUED | OUTPATIENT
Start: 2019-04-26 | End: 2019-04-27 | Stop reason: HOSPADM

## 2019-04-23 RX ORDER — PROPOFOL 10 MG/ML
INJECTION, EMULSION INTRAVENOUS PRN
Status: DISCONTINUED | OUTPATIENT
Start: 2019-04-23 | End: 2019-04-23

## 2019-04-23 RX ORDER — BUPIVACAINE HYDROCHLORIDE AND EPINEPHRINE 5; 5 MG/ML; UG/ML
INJECTION, SOLUTION EPIDURAL; INTRACAUDAL; PERINEURAL PRN
Status: DISCONTINUED | OUTPATIENT
Start: 2019-04-23 | End: 2019-04-23 | Stop reason: HOSPADM

## 2019-04-23 RX ORDER — BUPIVACAINE HYDROCHLORIDE 7.5 MG/ML
INJECTION, SOLUTION INTRASPINAL PRN
Status: DISCONTINUED | OUTPATIENT
Start: 2019-04-23 | End: 2019-04-23

## 2019-04-23 RX ORDER — MAGNESIUM HYDROXIDE 1200 MG/15ML
LIQUID ORAL PRN
Status: DISCONTINUED | OUTPATIENT
Start: 2019-04-23 | End: 2019-04-23 | Stop reason: HOSPADM

## 2019-04-23 RX ORDER — NALOXONE HYDROCHLORIDE 0.4 MG/ML
.1-.4 INJECTION, SOLUTION INTRAMUSCULAR; INTRAVENOUS; SUBCUTANEOUS
Status: DISCONTINUED | OUTPATIENT
Start: 2019-04-23 | End: 2019-04-27 | Stop reason: HOSPADM

## 2019-04-23 RX ADMIN — KETOROLAC TROMETHAMINE 30 MG: 30 INJECTION, SOLUTION INTRAMUSCULAR; INTRAVENOUS at 11:22

## 2019-04-23 RX ADMIN — SODIUM CHLORIDE, POTASSIUM CHLORIDE, SODIUM LACTATE AND CALCIUM CHLORIDE: 600; 310; 30; 20 INJECTION, SOLUTION INTRAVENOUS at 13:59

## 2019-04-23 RX ADMIN — PROPOFOL 200 MG: 10 INJECTION, EMULSION INTRAVENOUS at 13:26

## 2019-04-23 RX ADMIN — BUPIVACAINE HYDROCHLORIDE IN DEXTROSE 2 ML: 7.5 INJECTION, SOLUTION SUBARACHNOID at 13:23

## 2019-04-23 RX ADMIN — ROCURONIUM BROMIDE 45 MG: 10 INJECTION INTRAVENOUS at 13:33

## 2019-04-23 RX ADMIN — LIDOCAINE HYDROCHLORIDE 0.1 ML: 10 INJECTION, SOLUTION EPIDURAL; INFILTRATION; INTRACAUDAL; PERINEURAL at 11:21

## 2019-04-23 RX ADMIN — ROCURONIUM BROMIDE 50 MG: 10 INJECTION INTRAVENOUS at 13:46

## 2019-04-23 RX ADMIN — DEXAMETHASONE SODIUM PHOSPHATE 4 MG: 4 INJECTION, SOLUTION INTRA-ARTICULAR; INTRALESIONAL; INTRAMUSCULAR; INTRAVENOUS; SOFT TISSUE at 13:36

## 2019-04-23 RX ADMIN — FENTANYL CITRATE 100 MCG: 50 INJECTION, SOLUTION INTRAMUSCULAR; INTRAVENOUS at 13:11

## 2019-04-23 RX ADMIN — ROCURONIUM BROMIDE 10 MG: 10 INJECTION INTRAVENOUS at 16:40

## 2019-04-23 RX ADMIN — HYDROMORPHONE HYDROCHLORIDE 1 MG: 1 INJECTION, SOLUTION INTRAMUSCULAR; INTRAVENOUS; SUBCUTANEOUS at 16:20

## 2019-04-23 RX ADMIN — GLYCOPYRROLATE 0.8 MG: 0.2 INJECTION, SOLUTION INTRAMUSCULAR; INTRAVENOUS at 17:04

## 2019-04-23 RX ADMIN — MIDAZOLAM 2 MG: 1 INJECTION INTRAMUSCULAR; INTRAVENOUS at 13:11

## 2019-04-23 RX ADMIN — SODIUM CHLORIDE, POTASSIUM CHLORIDE, SODIUM LACTATE AND CALCIUM CHLORIDE: 600; 310; 30; 20 INJECTION, SOLUTION INTRAVENOUS at 14:58

## 2019-04-23 RX ADMIN — KETOROLAC TROMETHAMINE 30 MG: 30 INJECTION, SOLUTION INTRAMUSCULAR; INTRAVENOUS at 23:38

## 2019-04-23 RX ADMIN — ONDANSETRON 4 MG: 2 INJECTION INTRAMUSCULAR; INTRAVENOUS at 13:26

## 2019-04-23 RX ADMIN — MORPHINE SULFATE 0.1 MG: 0.5 INJECTION, SOLUTION EPIDURAL; INTRATHECAL; INTRAVENOUS at 13:23

## 2019-04-23 RX ADMIN — LIDOCAINE HYDROCHLORIDE 80 MG: 20 INJECTION, SOLUTION INFILTRATION; PERINEURAL at 13:26

## 2019-04-23 RX ADMIN — ACETAMINOPHEN 975 MG: 325 TABLET, FILM COATED ORAL at 18:31

## 2019-04-23 RX ADMIN — SUCCINYLCHOLINE CHLORIDE 200 MG: 20 INJECTION, SOLUTION INTRAMUSCULAR; INTRAVENOUS; PARENTERAL at 13:26

## 2019-04-23 RX ADMIN — CEFOXITIN SODIUM 2 G: 1 POWDER, FOR SOLUTION INTRAVENOUS at 13:17

## 2019-04-23 RX ADMIN — NEOSTIGMINE METHYLSULFATE 5 MG: 1 INJECTION INTRAVENOUS at 17:04

## 2019-04-23 RX ADMIN — SODIUM CHLORIDE, POTASSIUM CHLORIDE, SODIUM LACTATE AND CALCIUM CHLORIDE: 600; 310; 30; 20 INJECTION, SOLUTION INTRAVENOUS at 21:15

## 2019-04-23 RX ADMIN — SODIUM CHLORIDE, POTASSIUM CHLORIDE, SODIUM LACTATE AND CALCIUM CHLORIDE: 600; 310; 30; 20 INJECTION, SOLUTION INTRAVENOUS at 11:21

## 2019-04-23 RX ADMIN — ACETAMINOPHEN 975 MG: 325 TABLET, FILM COATED ORAL at 11:13

## 2019-04-23 RX ADMIN — DEXAMETHASONE SODIUM PHOSPHATE 4 MG: 4 INJECTION, SOLUTION INTRA-ARTICULAR; INTRALESIONAL; INTRAMUSCULAR; INTRAVENOUS; SOFT TISSUE at 16:33

## 2019-04-23 RX ADMIN — KETAMINE HYDROCHLORIDE 30 MG: 50 INJECTION, SOLUTION INTRAMUSCULAR; INTRAVENOUS at 15:08

## 2019-04-23 RX ADMIN — ROCURONIUM BROMIDE 5 MG: 10 INJECTION INTRAVENOUS at 13:26

## 2019-04-23 RX ADMIN — ROCURONIUM BROMIDE 20 MG: 10 INJECTION INTRAVENOUS at 16:09

## 2019-04-23 RX ADMIN — KETOROLAC TROMETHAMINE 30 MG: 30 INJECTION, SOLUTION INTRAMUSCULAR; INTRAVENOUS at 18:34

## 2019-04-23 ASSESSMENT — ACTIVITIES OF DAILY LIVING (ADL)
FALL_HISTORY_WITHIN_LAST_SIX_MONTHS: NO
RETIRED_COMMUNICATION: 0-->UNDERSTANDS/COMMUNICATES WITHOUT DIFFICULTY
ADLS_ACUITY_SCORE: 12
COGNITION: 0 - NO COGNITION ISSUES REPORTED
DRESS: 0-->INDEPENDENT
TRANSFERRING: 0-->INDEPENDENT
AMBULATION: 0-->INDEPENDENT
SWALLOWING: 0-->SWALLOWS FOODS/LIQUIDS WITHOUT DIFFICULTY
TOILETING: 0-->INDEPENDENT
RETIRED_EATING: 0-->INDEPENDENT
BATHING: 0-->INDEPENDENT

## 2019-04-23 ASSESSMENT — MIFFLIN-ST. JEOR: SCORE: 2136.25

## 2019-04-23 NOTE — ANESTHESIA POSTPROCEDURE EVALUATION
Patient: Sunday Ramírez    Procedure(s):  Colectomy left    Diagnosis:colon cancer  Diagnosis Additional Information: No value filed.    Anesthesia Type:  General, ETT    Note:  Anesthesia Post Evaluation    Patient location during evaluation: PACU  Patient participation: Able to fully participate in evaluation  Level of consciousness: awake and alert  Pain management: adequate  Airway patency: patent  Cardiovascular status: acceptable  Respiratory status: acceptable  Hydration status: acceptable  PONV: none     Anesthetic complications: None          Last vitals:  Vitals:    04/23/19 1755 04/23/19 1759 04/23/19 1801   BP: 120/80 120/80 116/70   Pulse: 88  82   Resp:      Temp:  96.5  F (35.8  C)    SpO2: 93% 93% 93%         Electronically Signed By: VITALIY THOMPSON CRNA  April 23, 2019  6:06 PM

## 2019-04-23 NOTE — PROGRESS NOTES
Patient admitted to [facility] from PACU. VSS. Placed on 2L of O2 satting 91%. Denies pain. Call light within reach. Spouse at bedside. Denisse Gonzalez RN on 4/23/2019 at 6:30 PM

## 2019-04-23 NOTE — PROGRESS NOTES
Nurse to nurse from ELIZA Torres in PACU. All questions answered. Denisse Gonzalez RN on 4/23/2019 at 6:10 PM

## 2019-04-23 NOTE — BRIEF OP NOTE
[facility] Brief Operative Note    Pre-operative diagnosis: colon cancer   Post-operative diagnosis  Colon cancer    Procedure: Procedure(s):  Colectomy left, primary anastomosis   Surgeon(s): Surgeon(s) and Role:     * Alexsander Tinoco MD - Primary   Estimated blood loss: 200 mL    Specimens: ID Type Source Tests Collected by Time Destination   A : portions of left colon, stitch at proximal margin Tissue Large Intestine, Left/Descending SURGICAL PATHOLOGY EXAM Alexsander Tinoco MD 4/23/2019  3:28 PM    B : additional lateral margin Tissue Colon SURGICAL PATHOLOGY EXAM Alexsander Tinoco MD 4/23/2019  4:23 PM       Findings: Descending colon mass growing in to lateral abdominal wall

## 2019-04-23 NOTE — OP NOTE
PREOPERATIVE  DIAGNOSIS: colon cancer      POSTOPERATIVE DIAGNOSIS:colon cancer     PROCEDURE PERFORMED:  Left hemicolectomy with primary side to side stapled anastomosis    SURGEON:  Alexsander Tinoco MD    ASSISTANT: Circulator: Ariella Robin RN; Amira Mejia RN  Relief Circulator: Malika Sanchez RN  Scrub Person: Joana Cárdenas  First Assistant: Clary Frost RN  2nd Scrub: Mariela Lei  Pre-Op Nurse: Quan Leblanc RN    ANESTHESIA: * No anesthesia type entered *CRNA Independent: Juventino Fang APRN CRNA; Emory Lima APRN CRNA    FAMILYPHYSICIAN: Primitivo Maxwell      INDICATION FOR THE PROCEDURE:  The patient is a 43 year old y.o. male with a invasive colon cancer of the descending colon.  This is confirmed by biopsy.       PROCEDURE:  Sunday Ramírez was brought to the operating room placed in supine position.    General anesthetic was applied and the patient was intubated.  Patient was prepped and draped in usual sterile fashion.  Timeout was performed and everyone was in agreement to proceed.  A small incision was made just inferior to the umbilicus.  The subtenons tissues were dissected down to the umbilical stalk with a hemostat clamp.  A Metter was used to grasp the umbilical stalk and elevate the abdominal wall.  Veress needle was placed in the abdomen and abdominal placement was confirmed by drop test.  Abdomen was insufflated to 15 mmHg.  The 5 mm 30 degree scope was loaded onto a 5 mm Optiview trocar in this set up was placed into the incision and slowly advanced into the abdomen.  Once in the abdomen, the obturator was removed and the camera was reinserted in the abdomen.  There did not appear to be any damage to underlying bowel or omentum.  Additional 5 mm ports were placed in the left lower quadrant and upper midline area under direct vision to assist with dissection.  The patient was rolled to his right to facilitate dissection.  The white line of Toldt was  identified just above the sigmoid colon and dissection was begun in this area.  The tattooed area of the colon was in the mid descending colon.  In the mass appeared to be adherent to the left abdominal sidewall.  We attempted to remove the mass from the sidewall but dissection around this was quite difficult laparoscopically.  We began to take down the splenic flexure but dissection was difficult as the patient has a large abdomen and he is quite obese.  We mobilize as much as we could safely laparoscopically and then decided to continue the operation open.  The 5 mm ports were removed under direct vision of the abdomen was desufflated.  The knee midline incision was extended inferiorly and superiorly around the umbilicus and up a couple of centimeters.  The subcutaneous tissues were dissected down the level of the fascia with electrocautery.  Midline was identified and the fascia and this was incised with electrocautery.  I was able to put my finger into the previous hole where the laprascopic port was in protect the bowel while cutting the fascia with electrocautery.  We incised the fascia the length of the incision.  The Omni retractor was set up and retractors were used to hold the left abdominal wall back.  Small bowel and omentum was packed away in the right upper quadrant with moist towels.  The tattooed mass remained adherent to the abdominal sidewall.  This was carefully removed with electrocautery and LigaSure.  Once the mass was mobilized off the abdominal sidewall continued to mobilize the left colon by continuing incision of the white line of Toldt.  The colon was mobilized in a retrograde fashion all the way to the mid transverse colon.  The descending colon was further mobilized by gently sweeping the connective tissue attachments laterally as the colon was pulled medially.  I was able to identify the left colic artery.  This was circumferentially dissected and ligated just as it took off the MONI.  A  proximal point of resection was chosen on the colon, it was roughly 15 cm distal to the mass, just above the sigmoid colon.  The colon was stapled and divided here using a 75 mm KEN stapler with blue load.  In a similar fashion the distal transverse colon was divided well proximal to the mass.  The mesentery was slowly divided with LigaSure to include a wedge down to the ligated left colic artery.  The specimen was then freed and removed from the body.  The proximal margin was tagged with a stitch and the specimen was passed off the field.  There was some mild bleeding up towards the spleen that was controlled with electrocautery and pressure.  The sigmoid colon was fully mobilized laterally and the distal transverse colon and sigmoid colon easily pulled together.  Side-to-side, functional end-to-end anastomosis was planned between the distal transverse colon and sigmoid colon.  A tacking suture was placed roughly 8 cm from the cut ends of the bowel.  Another tacking suture was placed right near the suture lines.  Small hole was cut in either end of the bowel with a scissors to take out the corner of the existing staple line.  A 75 mm KEN stapler with blue load was passed into both the proximal distal limb of the colon and the ends of the colon was pulled all the way back on the stapler.  Once we assured that the antimesenteric borders were opposed and there was no other bowel, epiploic appendage, mesentery between the bowel the stapler was fired.  The stapler was carefully removed and passed off the field onto the dirty part of the field.  There is some mild bleeding from staple lines that we typically controlled with electrocautery.  The remaining enterotomy was closed in running fashion with 3-0 Vicryl suture.  The closed enterotomy was then reinforced with interrupted Lembert style sutures of 3-0 Vicryl.  The anastomosis seemed wide and patent and this was laid back down in the left upper quadrant of the  abdomen.  We turned our attention back to where the abdominal mass was adherent to the abdominal wall and it felt as though there was still some residual mass left on the abdominal wall.  I took an additional margin of tissue from this area in hopes for complete, R0, resection.  When this additional margin was taken it did not visually appeare, or feel as though there were any residual, gross tumor left.  The abdomen was irrigated with 1 L of normal saline and returns were cleared.  The anastomosis was then inspected and appeared to be intact.  The packs were removed and small bowel was allowed to lay over the anastomosis.  The omentum was then draped over the small intestine.  We switched over to the closing tray at this time and donned new gowns and gloves.  The fascia was closed with 0 looped PDS suture in a running fashion from the distal and proximal ends.  The subcutaneous tissues were reapproximated with 3-0 Vicryl suture and the skin was closed with running 4-0 Monocryl.  The upper abdominal and left lower quadrant port sites were closed with 4-0 Monocryl sutures.  The skin was cleansed and Steri-Strips were applied to the incisions.   At the end of the case all sponge and needle counts were correct.  The patient tolerated procedure well. They were extubated in the OR and was taken to the recovery room in good condition.        JEMIMA Tinoco MD

## 2019-04-23 NOTE — INTERVAL H&P NOTE
I saw and examined Sunday RAMÓN Ramírez.  I have reviewed the history and physical and find no changes to the patient's medical status or condition with the exceptions noted below.     Alexsander Tinoco MD  12:51 PM 4/23/2019

## 2019-04-23 NOTE — PROGRESS NOTES
PACU Transfer Note    Khurram Ramírez was transferred to [facility] via cart.  Equipment used for transport:  none.  Accompanied by:  ELIZA Torres    PACU Respiratory Event Documentation     1) Episodes of Apnea greater than or equal to 10 seconds: 0    2) Bradypnea - less than 8 breaths per minute: none    3) Pain score on 0 to 10 scale: 0    4) Pain-sedation mismatch (yes or no): na    5) Repeated 02 desaturation less than 90% (yes or no): none    Anesthesia notified? (yes or no): na    Any of the above events occuring repeatedly in separate 30 minute intervals may be considered recurrent PACU respiratory events.    Patient stable and meets phase 1 discharge criteria for transport from PACU.

## 2019-04-23 NOTE — ANESTHESIA PREPROCEDURE EVALUATION
Anesthesia Pre-Procedure Evaluation    Patient: Sunday Ramírez   MRN: [id number] : 1975          Preoperative Diagnosis: colon cancer    Procedure(s):  LAPAROSCOPIC ASSISTED SIGMOID COLECTOMY, Possible Colostomy    Past Medical History:   Diagnosis Date     Colonic mass 04/10/2019     Injury of left forearm     04     Uncomplicated asthma     Hx of asthma     Past Surgical History:   Procedure Laterality Date     COLONOSCOPY  04/10/2019    sigmoid mass     COLONOSCOPY N/A 4/10/2019    Procedure: COMBINED COLONOSCOPY, SINGLE OR MULTIPLE BIOPSY/POLYPECTOMY BY BIOPSY WITH SUBMUCOSAL INJECTION/TATTOO;  Surgeon: Alexsander Tinoco MD;  Location:  OR       Anesthesia Evaluation     . Pt has had prior anesthetic. Type: MAC    No history of anesthetic complications          ROS/MED HX    ENT/Pulmonary: Comment: Remote history of asthma.       Neurologic:  - neg neurologic ROS     Cardiovascular:  - neg cardiovascular ROS       METS/Exercise Tolerance:  >4 METS   Hematologic:  - neg hematologic  ROS       Musculoskeletal:  - neg musculoskeletal ROS       GI/Hepatic:  - neg GI/hepatic ROS       Renal/Genitourinary:  - ROS Renal section negative       Endo:  - neg endo ROS       Psychiatric:  - neg psychiatric ROS       Infectious Disease:  - neg infectious disease ROS       Malignancy:   (+) Malignancy History of GI          Other: Comment: Smokeless tobacco user.  No use for two days.  Not interested in quitting.    (+) No chance of pregnancy                         Physical Exam  Normal systems: cardiovascular, pulmonary and dental    Airway   Mallampati: I  TM distance: >3 FB  Neck ROM: full    Dental     Cardiovascular   Rhythm and rate: regular and normal      Pulmonary    breath sounds clear to auscultation            Lab Results   Component Value Date    WBC 5.6 04/10/2019    HGB 12.1 (L) 04/10/2019    HCT 39.2 (L) 04/10/2019     04/10/2019     04/10/2019    POTASSIUM 4.0 04/10/2019     "CHLORIDE 105 04/10/2019    CO2 28 04/10/2019    BUN 16 04/10/2019    CR 1.04 04/10/2019     (H) 04/10/2019    TABATHA 9.1 04/10/2019    ALBUMIN 3.7 04/10/2019    PROTTOTAL 6.5 04/10/2019    ALT 27 04/10/2019    AST 20 04/10/2019    ALKPHOS 66 04/10/2019    BILITOTAL 0.4 04/10/2019       Preop Vitals  BP Readings from Last 3 Encounters:   04/23/19 128/90   04/15/19 122/88   04/10/19 128/87    Pulse Readings from Last 3 Encounters:   04/15/19 82   04/10/19 68   03/25/19 92      Resp Readings from Last 3 Encounters:   04/23/19 16   04/15/19 20   04/10/19 16    SpO2 Readings from Last 3 Encounters:   04/23/19 95%   04/10/19 97%   03/25/19 97%      Temp Readings from Last 1 Encounters:   04/23/19 97.5  F (36.4  C) (Tympanic)    Ht Readings from Last 1 Encounters:   04/15/19 1.778 m (5' 10\")      Wt Readings from Last 1 Encounters:   04/23/19 120.9 kg (266 lb 9.6 oz)    Estimated body mass index is 38.25 kg/m  as calculated from the following:    Height as of 4/15/19: 1.778 m (5' 10\").    Weight as of this encounter: 120.9 kg (266 lb 9.6 oz).       Anesthesia Plan      History & Physical Review      ASA Status:  2 .    NPO Status:  > 6 hours    Plan for General and ETT (with ITN) with Intravenous induction. Maintenance will be Balanced.    PONV prophylaxis:  Ondansetron (or other 5HT-3) and Dexamethasone or Solumedrol       Postoperative Care      Consents  Anesthetic plan, risks, benefits and alternatives discussed with:  Patient..                 VITALIY THOMPSON CRNA  "

## 2019-04-23 NOTE — ANESTHESIA PROCEDURE NOTES
Peripheral nerve/Neuraxial procedure note : intrathecal  Pre-Procedure  Performed by  Emory Lima APRN CRNA   Location: OR    Procedure Times:4/23/2019 1:16 PM and 4/23/2019 1:23 PM  Pre-Anesthestic Checklist: patient identified, IV checked, risks and benefits discussed, informed consent, monitors and equipment checked, pre-op evaluation, at physician/surgeon's request and post-op pain management    Timeout  Correct Patient: Yes   Correct Procedure: Yes   Correct Site: Yes   Correct Laterality: Yes   Correct Position: Yes     .   Procedure Documentation  ASA 2  Diagnosis:Cancer.    Procedure:    Intrathecal.  Insertion Site:L3-4  (midline approach)      Patient Prep;mask, sterile gloves, chlorhexidine gluconate and isopropyl alcohol, patient draped.  .  Needle: Monalisa tip Spinal Needle (gauge): 25  Spinal/LP Needle Length (inches): 5 # of attempts: 2 and  # of redirects:  2 Introducer used Introducer: 20 G .       Assessment/Narrative  Paresthesias: No.  .  .  clear CSF fluid removed while sitting   . Time Injected: 13:23

## 2019-04-23 NOTE — ANESTHESIA CARE TRANSFER NOTE
Patient: Sunday Ramírez    Procedure(s):  Colectomy left    Diagnosis: colon cancer  Diagnosis Additional Information: No value filed.    Anesthesia Type:   General, ETT     Note:  Airway :Face Mask  Patient transferred to:PACU  Handoff Report: Identifed the Patient, Identified the Reponsible Provider, Reviewed the pertinent medical history, Discussed the surgical course, Reviewed Intra-OP anesthesia mangement and issues during anesthesia, Set expectations for post-procedure period and Allowed opportunity for questions and acknowledgement of understanding      Vitals: (Last set prior to Anesthesia Care Transfer)    CRNA VITALS  4/23/2019 1700 - 4/23/2019 1733      4/23/2019             Pulse:  94    Ht Rate:  94    SpO2:  97 %    Resp Rate (set):  10                Electronically Signed By: VITALIY THOMPSON CRNA  April 23, 2019  5:33 PM

## 2019-04-24 LAB
ANION GAP SERPL CALCULATED.3IONS-SCNC: 6 MMOL/L (ref 3–14)
BASOPHILS # BLD AUTO: 0 10E9/L (ref 0–0.2)
BASOPHILS NFR BLD AUTO: 0.1 %
BUN SERPL-MCNC: 20 MG/DL (ref 7–25)
CALCIUM SERPL-MCNC: 8.7 MG/DL (ref 8.6–10.3)
CHLORIDE SERPL-SCNC: 104 MMOL/L (ref 98–107)
CO2 SERPL-SCNC: 28 MMOL/L (ref 21–31)
CREAT SERPL-MCNC: 0.88 MG/DL (ref 0.7–1.3)
DIFFERENTIAL METHOD BLD: ABNORMAL
EOSINOPHIL # BLD AUTO: 0 10E9/L (ref 0–0.7)
EOSINOPHIL NFR BLD AUTO: 0 %
ERYTHROCYTE [DISTWIDTH] IN BLOOD BY AUTOMATED COUNT: 13.8 % (ref 10–15)
GFR SERPL CREATININE-BSD FRML MDRD: >90 ML/MIN/{1.73_M2}
GLUCOSE SERPL-MCNC: 118 MG/DL (ref 70–105)
HCT VFR BLD AUTO: 36.5 % (ref 40–53)
HGB BLD-MCNC: 11.5 G/DL (ref 13.3–17.7)
IMM GRANULOCYTES # BLD: 0.1 10E9/L (ref 0–0.4)
IMM GRANULOCYTES NFR BLD: 0.4 %
LYMPHOCYTES # BLD AUTO: 1 10E9/L (ref 0.8–5.3)
LYMPHOCYTES NFR BLD AUTO: 6.9 %
MAGNESIUM SERPL-MCNC: 1.8 MG/DL (ref 1.9–2.7)
MCH RBC QN AUTO: 26.7 PG (ref 26.5–33)
MCHC RBC AUTO-ENTMCNC: 31.5 G/DL (ref 31.5–36.5)
MCV RBC AUTO: 85 FL (ref 78–100)
MONOCYTES # BLD AUTO: 0.9 10E9/L (ref 0–1.3)
MONOCYTES NFR BLD AUTO: 6.6 %
NEUTROPHILS # BLD AUTO: 12 10E9/L (ref 1.6–8.3)
NEUTROPHILS NFR BLD AUTO: 86 %
PLATELET # BLD AUTO: 331 10E9/L (ref 150–450)
POTASSIUM SERPL-SCNC: 4.5 MMOL/L (ref 3.5–5.1)
RBC # BLD AUTO: 4.3 10E12/L (ref 4.4–5.9)
SODIUM SERPL-SCNC: 138 MMOL/L (ref 134–144)
WBC # BLD AUTO: 14 10E9/L (ref 4–11)

## 2019-04-24 PROCEDURE — 25000132 ZZH RX MED GY IP 250 OP 250 PS 637: Performed by: SURGERY

## 2019-04-24 PROCEDURE — 25000128 H RX IP 250 OP 636: Performed by: SURGERY

## 2019-04-24 PROCEDURE — 83735 ASSAY OF MAGNESIUM: CPT | Performed by: SURGERY

## 2019-04-24 PROCEDURE — 80048 BASIC METABOLIC PNL TOTAL CA: CPT | Performed by: SURGERY

## 2019-04-24 PROCEDURE — 85025 COMPLETE CBC W/AUTO DIFF WBC: CPT | Performed by: SURGERY

## 2019-04-24 PROCEDURE — 36415 COLL VENOUS BLD VENIPUNCTURE: CPT | Performed by: SURGERY

## 2019-04-24 PROCEDURE — 12000000 ZZH R&B MED SURG/OB

## 2019-04-24 PROCEDURE — 25800030 ZZH RX IP 258 OP 636: Performed by: SURGERY

## 2019-04-24 PROCEDURE — 90686 IIV4 VACC NO PRSV 0.5 ML IM: CPT | Performed by: SURGERY

## 2019-04-24 RX ORDER — MAGNESIUM SULFATE HEPTAHYDRATE 40 MG/ML
2 INJECTION, SOLUTION INTRAVENOUS ONCE
Status: COMPLETED | OUTPATIENT
Start: 2019-04-24 | End: 2019-04-24

## 2019-04-24 RX ORDER — DEXTROSE MONOHYDRATE, SODIUM CHLORIDE, AND POTASSIUM CHLORIDE 50; 1.49; 4.5 G/1000ML; G/1000ML; G/1000ML
INJECTION, SOLUTION INTRAVENOUS CONTINUOUS
Status: DISCONTINUED | OUTPATIENT
Start: 2019-04-24 | End: 2019-04-25

## 2019-04-24 RX ADMIN — POTASSIUM CHLORIDE, DEXTROSE MONOHYDRATE AND SODIUM CHLORIDE: 150; 5; 450 INJECTION, SOLUTION INTRAVENOUS at 09:35

## 2019-04-24 RX ADMIN — ACETAMINOPHEN 975 MG: 325 TABLET, FILM COATED ORAL at 17:46

## 2019-04-24 RX ADMIN — ACETAMINOPHEN 975 MG: 325 TABLET, FILM COATED ORAL at 01:35

## 2019-04-24 RX ADMIN — INFLUENZA A VIRUS A/MICHIGAN/45/2015 X-275 (H1N1) ANTIGEN (FORMALDEHYDE INACTIVATED), INFLUENZA A VIRUS A/SINGAPORE/INFIMH-16-0019/2016 IVR-186 (H3N2) ANTIGEN (FORMALDEHYDE INACTIVATED), INFLUENZA B VIRUS B/PHUKET/3073/2013 ANTIGEN (FORMALDEHYDE INACTIVATED), AND INFLUENZA B VIRUS B/MARYLAND/15/2016 BX-69A ANTIGEN (FORMALDEHYDE INACTIVATED) 0.5 ML: 15; 15; 15; 15 INJECTION, SUSPENSION INTRAMUSCULAR at 09:37

## 2019-04-24 RX ADMIN — OXYCODONE HYDROCHLORIDE 10 MG: 5 TABLET ORAL at 21:42

## 2019-04-24 RX ADMIN — MAGNESIUM SULFATE HEPTAHYDRATE 2 G: 40 INJECTION, SOLUTION INTRAVENOUS at 08:26

## 2019-04-24 RX ADMIN — KETOROLAC TROMETHAMINE 30 MG: 30 INJECTION, SOLUTION INTRAMUSCULAR; INTRAVENOUS at 05:10

## 2019-04-24 RX ADMIN — ACETAMINOPHEN 975 MG: 325 TABLET, FILM COATED ORAL at 09:36

## 2019-04-24 RX ADMIN — KETOROLAC TROMETHAMINE 30 MG: 30 INJECTION, SOLUTION INTRAMUSCULAR; INTRAVENOUS at 12:29

## 2019-04-24 RX ADMIN — SODIUM CHLORIDE, POTASSIUM CHLORIDE, SODIUM LACTATE AND CALCIUM CHLORIDE: 600; 310; 30; 20 INJECTION, SOLUTION INTRAVENOUS at 05:09

## 2019-04-24 RX ADMIN — ENOXAPARIN SODIUM 40 MG: 40 INJECTION SUBCUTANEOUS at 09:36

## 2019-04-24 RX ADMIN — POTASSIUM CHLORIDE, DEXTROSE MONOHYDRATE AND SODIUM CHLORIDE: 150; 5; 450 INJECTION, SOLUTION INTRAVENOUS at 22:47

## 2019-04-24 ASSESSMENT — ACTIVITIES OF DAILY LIVING (ADL)
ADLS_ACUITY_SCORE: 12

## 2019-04-24 ASSESSMENT — MIFFLIN-ST. JEOR: SCORE: 2146.25

## 2019-04-24 NOTE — PROGRESS NOTES
GENERAL SURGERY PROGRESS NOTE  4/24/2019      Interval history:   No acute events overnight. Pain is well controlled. Patient has not been out of bed since surgery. He is tolerating ice chips with no nausea or vomiting. He denies flatus, but does feel rumbling in his stomach. He denies fevers, chills, chest pain or SOB.     Physical Exam:   Vitals are reviewed and there are no significant abnormalities     General: laying in bed, appears comfortable   HEENT: nasal cannula O2 in place  Lungs: no increased work of breathing, lungs are CTAB  CV: RRR, no murmur   Abdomen: obese abdomen, soft, mildly tender about the incisions, mildly distended. Dressings are clear and intact  MSK: no lower extremity edema   Neuro: alert and oriented, normal speech and mentation     Labs are reviewed and are significant for Cr 0.88, Mg 1.8, WBC 14, Hgb, 11.5    No new imaging       Assessment / Plan:   Sunday Ramírez is a 43 year old male who is POD#1 from laparoscopic-assisted left hemicolectomy for colon cancer. He is hemodynamically stable and doing well. Leukocytosis is likely due to reaction from surgery. Hypomagnesia.      - replace Mg  - continue current pain regimen  - remove eaton   - Advance to clear diet   - slow IVF  - ambulate TID  - lovenox / SCD  - follow labs       JEMIMA Tinoco MD   4/24/2019

## 2019-04-24 NOTE — PLAN OF CARE
"Pt has had very minimal pain to no pain this shift. When pt experienced 1/10 abdominal pain, he moved some but he said he does not have pain. Scheduled pain meds given and writer educated pt about the importance of letting her know when he starts to experience pain. Pt verbally agreed to let writer know. Incision Clean, dry, intact. Upper abdominal incision has scant amount of drainage that has stayed within the outlines this shift. Pt denies feeling nauseated. Bowel sounds active and pt tolerating ice chips. /78   Pulse 85   Temp 98.4  F (36.9  C) (Tympanic)   Resp 14   Ht 1.778 m (5' 10\")   Wt 125 kg (275 lb 9.2 oz)   SpO2 97%   BMI 39.54 kg/m    "

## 2019-04-24 NOTE — PLAN OF CARE
VSS. On RA and has remained afebrile. BPs stable. Abd firm, rounded. Bowel tones active x 4. No flatus. Denies nausea. States he has minimal pain in his abd and has not needed any PRN pain meds. Voiding well. Tolerating clear liquids. Ambulated the halls several times today. C/o feeling bloated. Encouraged to keep moving and ambulating t/o day. Drsgs on abd remain clean, dry, and intact. Denisse Gonzalez RN on 4/24/2019 at 2:50 PM

## 2019-04-24 NOTE — PROGRESS NOTES
Patient tolerating clear liquids this AM. Denies pain. No nausea. Denisse Gonzalez RN on 4/24/2019 at 9:43 AM

## 2019-04-24 NOTE — PHARMACY-ADMISSION MEDICATION HISTORY
Pharmacy -- Admission Medication Reconciliation    Prior to admission (PTA) medications were reviewed and the patient's PTA medication list was updated.    Sources Consulted: [facility], [facility], patient    The reliability of this Medication Reconciliation is: Reliability: Borderline reliable    The following significant changes were made:   Added APAP, Excedrin, IBU prn   Removed completed pre-op meds:  neomycin, flaygl, nulytely   Updated discharge pharmacy to [facility] per patient request    *Patient was very sleepy when I interviewed him= borderline reliable    In addition, the patient's allergies were reviewed with the patient and updated as follows:   Allergies: Patient has no known allergies.    The pharmacist has reviewed with the patient that all personal medications should be removed from the building or locked in the belongings safe.  Patient shall only take medications ordered by the physician and administered by the nursing staff.       Medication barriers identified: none   Medication adherence concerns: none   Understanding of emergency medications: EUGENIO Atwood RPH, 4/23/2019,  7:09 PM

## 2019-04-25 LAB
ANION GAP SERPL CALCULATED.3IONS-SCNC: 6 MMOL/L (ref 3–14)
BUN SERPL-MCNC: 14 MG/DL (ref 7–25)
CALCIUM SERPL-MCNC: 8.6 MG/DL (ref 8.6–10.3)
CHLORIDE SERPL-SCNC: 108 MMOL/L (ref 98–107)
CO2 SERPL-SCNC: 27 MMOL/L (ref 21–31)
CREAT SERPL-MCNC: 0.84 MG/DL (ref 0.7–1.3)
ERYTHROCYTE [DISTWIDTH] IN BLOOD BY AUTOMATED COUNT: 14 % (ref 10–15)
GFR SERPL CREATININE-BSD FRML MDRD: >90 ML/MIN/{1.73_M2}
GLUCOSE SERPL-MCNC: 108 MG/DL (ref 70–105)
HCT VFR BLD AUTO: 34.2 % (ref 40–53)
HGB BLD-MCNC: 10.9 G/DL (ref 13.3–17.7)
MAGNESIUM SERPL-MCNC: 2.2 MG/DL (ref 1.9–2.7)
MCH RBC QN AUTO: 26.7 PG (ref 26.5–33)
MCHC RBC AUTO-ENTMCNC: 31.9 G/DL (ref 31.5–36.5)
MCV RBC AUTO: 84 FL (ref 78–100)
PLATELET # BLD AUTO: 324 10E9/L (ref 150–450)
POTASSIUM SERPL-SCNC: 3.8 MMOL/L (ref 3.5–5.1)
RBC # BLD AUTO: 4.09 10E12/L (ref 4.4–5.9)
SODIUM SERPL-SCNC: 141 MMOL/L (ref 134–144)
WBC # BLD AUTO: 10.8 10E9/L (ref 4–11)

## 2019-04-25 PROCEDURE — 25000132 ZZH RX MED GY IP 250 OP 250 PS 637: Performed by: SURGERY

## 2019-04-25 PROCEDURE — 36415 COLL VENOUS BLD VENIPUNCTURE: CPT | Performed by: SURGERY

## 2019-04-25 PROCEDURE — 85027 COMPLETE CBC AUTOMATED: CPT | Performed by: SURGERY

## 2019-04-25 PROCEDURE — 83735 ASSAY OF MAGNESIUM: CPT | Performed by: SURGERY

## 2019-04-25 PROCEDURE — 12000000 ZZH R&B MED SURG/OB

## 2019-04-25 PROCEDURE — 80048 BASIC METABOLIC PNL TOTAL CA: CPT | Performed by: SURGERY

## 2019-04-25 PROCEDURE — 25000128 H RX IP 250 OP 636: Performed by: SURGERY

## 2019-04-25 RX ADMIN — ACETAMINOPHEN 975 MG: 325 TABLET, FILM COATED ORAL at 09:02

## 2019-04-25 RX ADMIN — ACETAMINOPHEN 975 MG: 325 TABLET, FILM COATED ORAL at 01:53

## 2019-04-25 RX ADMIN — ENOXAPARIN SODIUM 40 MG: 40 INJECTION SUBCUTANEOUS at 09:03

## 2019-04-25 RX ADMIN — OXYCODONE HYDROCHLORIDE 5 MG: 5 TABLET ORAL at 16:12

## 2019-04-25 RX ADMIN — OXYCODONE HYDROCHLORIDE 5 MG: 5 TABLET ORAL at 09:03

## 2019-04-25 RX ADMIN — ACETAMINOPHEN 975 MG: 325 TABLET, FILM COATED ORAL at 21:41

## 2019-04-25 RX ADMIN — OXYCODONE HYDROCHLORIDE 10 MG: 5 TABLET ORAL at 19:58

## 2019-04-25 RX ADMIN — ACETAMINOPHEN 975 MG: 325 TABLET, FILM COATED ORAL at 14:30

## 2019-04-25 ASSESSMENT — ACTIVITIES OF DAILY LIVING (ADL)
ADLS_ACUITY_SCORE: 10
ADLS_ACUITY_SCORE: 12

## 2019-04-25 NOTE — PLAN OF CARE
"Bowel sounds active, pt states to be passing gas and had a BM, and dressings are C/D/I. Scant amount of old drainage on upper abdominal dressing, unchanged. Pt had 4/10, dull pain at incision site. PRN Claribel given and pt said it helped. Pt up walking the halls this shift and tolerating Clear liquid diet.  /76   Pulse 85   Temp 98  F (36.7  C) (Tympanic)   Resp 18   Ht 1.778 m (5' 10\")   Wt 125 kg (275 lb 9.2 oz)   SpO2 95%   BMI 39.54 kg/m     "

## 2019-04-25 NOTE — PROGRESS NOTES
GENERAL SURGERY PROGRESS NOTE  4/24/2019      Interval history:   No acute events overnight. Pain is well moderately well controlled.  Patient is tolerating clear liquids, voiding spontaneously, ambulating independently.  Patient had a liquid bowel movement yesterday but is not passing flatus. He denies fevers, chills, chest pain or SOB.     Physical Exam:   Vitals are reviewed and there are no significant abnormalities     General: laying in bed, appears comfortable   Lungs: no increased work of breathing, lungs are CTAB  CV: RRR, no murmur   Abdomen: obese abdomen, soft, mildly tender about the incisions, mildly distended.  Incisions are clean dry and intact with Steri-Strips  MSK: no lower extremity edema   Neuro: alert and oriented, normal speech and mentation     Labs are reviewed and are significant for Cr 0.84, Mg 2.2, WBC 10.8, Hgb, 10.9    No new imaging       Assessment / Plan:   Sunday Ramírez is a 43 year old male who is POD#2 from laparoscopic-assisted left hemicolectomy for colon cancer. He is hemodynamically stable and doing well. Leukocytosis is resolved. Hypomagnesia is resolved.      - continue current pain regimen  - Advance to full liquid diet  -Discontinue IVF  - ambulate TID  - lovenox / SCD      JEMIMA Tinoco MD   4/24/2019

## 2019-04-25 NOTE — PLAN OF CARE
Bowel tones active x 4. Passing flatus. Tolerating full liquids. Denies nausea. Abd soft, rounded. Incisions open to air, steri-strips intact. Redness noted below lower incision, as well as the right side of incision. Blanches and is warm to touch, but incision does not appear infected. No drainage present. Has ambulated the hallway independently numerous times this morning and afternoon. Voiding adequately. Patient stated he had one watery stool this AM. Oxycodone 5 mg PO PRN administered this AM for incisional pain 3 out of 10. See MAR. Will continue to monitor and intervene as needed. Denisse Gonzalez RN on 4/25/2019 at 1:11 PM

## 2019-04-26 LAB — PLATELET # BLD AUTO: 322 10E9/L (ref 150–450)

## 2019-04-26 PROCEDURE — 12000000 ZZH R&B MED SURG/OB

## 2019-04-26 PROCEDURE — 36415 COLL VENOUS BLD VENIPUNCTURE: CPT | Performed by: SURGERY

## 2019-04-26 PROCEDURE — 25000132 ZZH RX MED GY IP 250 OP 250 PS 637: Performed by: SURGERY

## 2019-04-26 PROCEDURE — 85049 AUTOMATED PLATELET COUNT: CPT | Performed by: SURGERY

## 2019-04-26 PROCEDURE — 25000128 H RX IP 250 OP 636: Performed by: SURGERY

## 2019-04-26 RX ADMIN — ACETAMINOPHEN 650 MG: 325 TABLET, FILM COATED ORAL at 20:56

## 2019-04-26 RX ADMIN — ACETAMINOPHEN 975 MG: 325 TABLET, FILM COATED ORAL at 05:41

## 2019-04-26 RX ADMIN — OXYCODONE HYDROCHLORIDE 10 MG: 5 TABLET ORAL at 13:05

## 2019-04-26 RX ADMIN — ENOXAPARIN SODIUM 40 MG: 40 INJECTION SUBCUTANEOUS at 10:02

## 2019-04-26 ASSESSMENT — ACTIVITIES OF DAILY LIVING (ADL)
ADLS_ACUITY_SCORE: 10

## 2019-04-26 NOTE — PLAN OF CARE
Incision D/I without drainage or warmth, edges approximated. Pt ambulates adlib. Pain controled with cold compresses and pain medication. Tolerating diet well, BM's now soft, coffee ground. Passing gas.

## 2019-04-26 NOTE — PROGRESS NOTES
GENERAL SURGERY PROGRESS NOTE  4/24/2019      Interval history:   No acute events overnight. Pain is well controlled.  Patient is tolerating clear liquids, voiding spontaneously, ambulating independently.  Patient had 2 dark, liquid bowel movements yesterday and is consistently passing flatus. He denies fevers, chills, chest pain or SOB.     Physical Exam:   Vitals are reviewed and there are no significant abnormalities     General: laying in bed, appears comfortable   Lungs: no increased work of breathing, lungs are CTAB  CV: RRR, no murmur   Abdomen: obese abdomen, soft, mildly tender about the incisions, mildly distended.  Incisions are clean dry and intact with Steri-Strips  MSK: no lower extremity edema   Neuro: alert and oriented, normal speech and mentation     No new labs    No new imaging       Assessment / Plan:   Sunday Ramírez is a 43 year old male who is POD#3 from laparoscopic-assisted left hemicolectomy for colon cancer. He is hemodynamically stable and doing well.     - continue current pain regimen  - Advance to low residue   - ambulate TID  - lovenox / SCD  - anticipate discharge in the next day or two      JEMIMA Tinoco MD   4/24/2019

## 2019-04-26 NOTE — PLAN OF CARE
"Pt c/o lower abd pain 3/10. PRN Claribel and scheduled acetaminophen given to decrease pain. Pt showered independently and ambulated nursing unit with no difficulty. Pt had two watery/bloody stools. Lung sounds clear and equal bilaterally. Bowel sounds active. Incision remains intact with steri-strips ice packs applied. /80   Pulse 85   Temp 98  F (36.7  C) (Tympanic)   Resp 18   Ht 1.778 m (5' 10\")   Wt 125 kg (275 lb 9.2 oz)   SpO2 98% Nitza Cook RN on 4/26/2019 at 5:36 AM      "

## 2019-04-26 NOTE — PLAN OF CARE
Pt up ambulating in halls, denies pain at this time. His only request is to have his diet advanced. Pt incision is intact without drainage.  Has discoloration to the patients rt lower quadrant that is stated to have been there since yesterday. It is warm to the touch.

## 2019-04-27 VITALS
SYSTOLIC BLOOD PRESSURE: 146 MMHG | TEMPERATURE: 96.6 F | BODY MASS INDEX: 36.42 KG/M2 | OXYGEN SATURATION: 98 % | RESPIRATION RATE: 16 BRPM | HEIGHT: 70 IN | WEIGHT: 254.4 LBS | DIASTOLIC BLOOD PRESSURE: 89 MMHG | HEART RATE: 72 BPM

## 2019-04-27 PROCEDURE — 25000128 H RX IP 250 OP 636: Performed by: SURGERY

## 2019-04-27 PROCEDURE — 99024 POSTOP FOLLOW-UP VISIT: CPT | Performed by: SURGERY

## 2019-04-27 PROCEDURE — 25000132 ZZH RX MED GY IP 250 OP 250 PS 637: Performed by: SURGERY

## 2019-04-27 RX ADMIN — OXYCODONE HYDROCHLORIDE 10 MG: 5 TABLET ORAL at 02:49

## 2019-04-27 RX ADMIN — ACETAMINOPHEN 650 MG: 325 TABLET, FILM COATED ORAL at 09:21

## 2019-04-27 RX ADMIN — ENOXAPARIN SODIUM 40 MG: 40 INJECTION SUBCUTANEOUS at 09:21

## 2019-04-27 RX ADMIN — OXYCODONE HYDROCHLORIDE 10 MG: 5 TABLET ORAL at 11:11

## 2019-04-27 ASSESSMENT — ACTIVITIES OF DAILY LIVING (ADL)
ADLS_ACUITY_SCORE: 10

## 2019-04-27 ASSESSMENT — MIFFLIN-ST. JEOR: SCORE: 2050.2

## 2019-04-27 NOTE — PROGRESS NOTES
[facility] DISCHARGE NOTE    Patient discharged to home at 12:31 PM via wheel chair. Accompanied by spouse and staff. Discharge instructions reviewed with patient and spouse, opportunity offered to ask questions. Prescriptions - None ordered for discharge. All belongings sent with patient.    Darlene Mcdaniel

## 2019-04-27 NOTE — DISCHARGE SUMMARY
[facility]    Discharge Summary  Hospitalist    Date of Admission:  4/23/2019  Date of Discharge:  4/27/2019  Discharging Provider: Reyna Galaviz  Date of Service (when I saw the patient): 04/27/19    Discharge Diagnoses   Active Problems:    Colon cancer (H) (4/23/2019)      History of Present Illness   Sunday Ramírez is an 43 year old male who presented with sigmoid colon cancer.    Hospital Course   Sunday Ramírez was admitted on 4/23/2019.  The following problems were addressed during his hospitalization:    He did well following surgery. His pain was well controlled and he had rapid return of GI function. By 4/27 he was tolerating low residue diet, not requiring narcotic pain medication, had no sign of infection and was feeling ready for discharge.    # Discharge Pain Plan:   - During his hospitalization, Sunday experienced pain due to surgery.  The pain plan for discharge was discussed with Sunday and the plan was created in a collaborative fashion.    - Pharmacologic adjuvants:  NSAIDs and Acetaminophen  - Non-pharmacologic adjuvants: Ice    Reyna Galavzi MD    Significant Results and Procedures   Pathology pending    Pending Results   These results will be followed up by  at his next visit  Unresulted Labs Ordered in the Past 30 Days of this Admission     Date and Time Order Name Status Description    4/23/2019 1530 Surgical pathology exam In process           Code Status   Full Code       Primary Care Physician   Primitivo Maxwell    Physical Exam   Temp: 96.6  F (35.9  C) Temp src: Tympanic BP: 146/89 Pulse: 72 Heart Rate: 69 Resp: 16 SpO2: 98 % O2 Device: None (Room air)    Vitals:    04/23/19 1845 04/24/19 0419 04/27/19 0454   Weight: 124 kg (273 lb 5.9 oz) 125 kg (275 lb 9.2 oz) 115.4 kg (254 lb 6.4 oz)     Vital Signs with Ranges  Temp:  [96.6  F (35.9  C)-99.8  F (37.7  C)] 96.6  F (35.9  C)  Pulse:  [72-78] 72  Heart Rate:  [69-77] 69  Resp:  [16-18] 16  BP: (116-146)/(66-89)  146/89  SpO2:  [97 %-98 %] 98 %  I/O last 3 completed shifts:  In: 236 [P.O.:236]  Out: -     Constitutional: nad  Eyes: no icterus  ENT: mucus membranes are pink and moist  Respiratory: lungs clear  Cardiovascular: heart regular  GI: abdomen soft and minimal tenderness, incisions clean dry and intact with steri strips    Discharge Disposition   Discharged to home  Condition at discharge: Stable    Consultations This Hospital Stay   None    Time Spent on this Encounter   IReyna, personally saw the patient today and spent less than or equal to 30 minutes discharging this patient.    Discharge Orders      Follow-up and recommended labs and tests     Follow up with Dr. Tinoco as scheduled.     Reason for your hospital stay    Remove portion of colon     Activity    Your activity upon discharge: no lifting more than 10 pounds or strenuous exercise for 4-6 weeks. No driving for 1-2 weeks.     Discharge Instructions    Ok to shower, no soaking in tub, hot tub, pool or lake for 1 week.     Diet    Follow this diet upon discharge: Orders Placed This Encounter      Low Fiber Diet     Discharge Medications   Current Discharge Medication List      CONTINUE these medications which have NOT CHANGED    Details   acetaminophen (TYLENOL) 500 MG tablet Take 500-1,000 mg by mouth every 6 hours as needed for mild pain      aspirin-acetaminophen-caffeine (EXCEDRIN MIGRAINE) 250-250-65 MG tablet Take 1 tablet by mouth every 6 hours as needed for headaches      ibuprofen (ADVIL/MOTRIN) 200 MG tablet Take 200 mg by mouth every 6 hours as needed for mild pain           Allergies   No Known Allergies  Data   Most Recent 3 CBC's:  Recent Labs   Lab Test 04/26/19  0457 04/25/19  0454 04/24/19  0457  04/10/19  1019   WBC  --  10.8 14.0*  --  5.6   HGB  --  10.9* 11.5*  --  12.1*   MCV  --  84 85  --  86    324 331   < > 285    < > = values in this interval not displayed.      Most Recent 3 BMP's:  Recent Labs   Lab Test  04/25/19  0454 04/24/19  0457 04/23/19  1845 04/10/19  1019    138  --  139   POTASSIUM 3.8 4.5  --  4.0   CHLORIDE 108* 104  --  105   CO2 27 28  --  28   BUN 14 20  --  16   CR 0.84 0.88 0.87 1.04   ANIONGAP 6 6  --  6   TABATHA 8.6 8.7  --  9.1   * 118*  --  112*     Most Recent 2 LFT's:  Recent Labs   Lab Test 04/10/19  1019   AST 20   ALT 27   ALKPHOS 66   BILITOTAL 0.4     Most Recent INR's and Anticoagulation Dosing History:  Anticoagulation Dose History     There is no flowsheet data to display.        Most Recent 3 Troponin's:No lab results found.  Most Recent Cholesterol Panel:No lab results found.  Most Recent 6 Bacteria Isolates From Any Culture (See EPIC Reports for Culture Details):No lab results found.  Most Recent TSH, T4 and A1c Labs:No lab results found.  Results for orders placed or performed during the hospital encounter of 02/27/19   CT Abdomen Pelvis w Contrast    Narrative    EXAMINATION: CT ABDOMEN PELVIS W CONTRAST, 2/27/2019 11:45 AM    TECHNIQUE:  Helical CT images from the lung bases through the  symphysis pubis were obtained  with IV contrast. Contrast dose: ISOVUE  300  100ML    COMPARISON: none    HISTORY: LLQ abdominal pain    FINDINGS:    There is dependent atelectasis at the lung bases.    The liver is free of masses or biliary ductal enlargement. No  calcified gallstones are seen.    The the spleen and pancreas appear normal.    The adrenal glands are normal.    The right and left kidneys are free of masses or hydronephrosis.    The periaortic lymph nodes are normal in caliber.    There is a focally thickened area of the mid descending colon with  surrounding inflammatory changes. This is suspicious for colonic  malignancy although focal colitis or epiploic appendagitis could  produce this appearance. The appendix was visualized and appears  normal..    In the pelvis the bladder and rectum appear normal.    Degenerative changes are present at L5-S1      Impression     IMPRESSION: Mid descending colon focal wall thickening measuring 5 cm  in length. This area is suspicious for malignancy. There is however  surrounding inflammatory changes and focal colitis or epiploic  appendagitis could produce this appearance     JEREL DELA CRUZ MD

## 2019-04-27 NOTE — PHARMACY - DISCHARGE MEDICATION RECONCILIATION
Pharmacy:  Discharge Medication Reconciliation    Sunday Ramírez  [address] [place] [address]   [place] [address]  [phone] (home)   43 year old male  PCP: Primitivo Maxwell    Allergies: Patient has no known allergies.    No new medications.    Discharge Medication Reconciliation:    Michelle Gonzalez [facility] has reviewed the patient's discharge medication orders and has compared them to the inpatient medication administration record and to what the patient was taking prior to admission - any discrepancies have been resolved.    It has been determined that the patient has an adequate supply of medications available or which can be obtained from the patient's preferred pharmacy.    Thank you for the consult.    Michelle Gonzalez RPH........April 27, 2019 1:00 PM

## 2019-04-27 NOTE — PLAN OF CARE
"Pt c/o lower abd pain. PRN Claribel and Tylenol given with ice packs. Lung sounds with expiratory wheezes bilateral bases. Pt educated to turn cough deep breathe while splinting abd with pillow to prevent pain. Bowel sounds active. Pt ambulating independently. /80   Pulse 76   Temp 98.4  F (36.9  C) (Tympanic)   Resp 16   Ht 1.778 m (5' 10\")   Wt 125 kg (275 lb 9.2 oz)   SpO2 97%  room air. Nitza Cook RN on 4/27/2019 at 4:31 AM      "

## 2019-04-27 NOTE — PLAN OF CARE
Dr. Galaviz discharged patient. He states he is elated. Pain medication given for 4/10 per request to get him home. Incision intact without S & S of infection. LCTAB, HRRR, BS active times 4 quadrants. Voids and bowel movements without difficulty.

## 2019-04-30 ENCOUNTER — TELEPHONE (OUTPATIENT)
Dept: SURGERY | Facility: OTHER | Age: 44
End: 2019-04-30

## 2019-04-30 NOTE — TELEPHONE ENCOUNTER
Patient is wondering if he can take the tape off of his incision?  Amira Miller LPN  4/30/2019  2:19 PM

## 2019-05-06 ENCOUNTER — OFFICE VISIT (OUTPATIENT)
Dept: SURGERY | Facility: OTHER | Age: 44
End: 2019-05-06
Attending: SURGERY
Payer: COMMERCIAL

## 2019-05-06 VITALS
WEIGHT: 265.6 LBS | SYSTOLIC BLOOD PRESSURE: 136 MMHG | RESPIRATION RATE: 18 BRPM | HEART RATE: 104 BPM | DIASTOLIC BLOOD PRESSURE: 84 MMHG | TEMPERATURE: 97.8 F | BODY MASS INDEX: 38.11 KG/M2

## 2019-05-06 DIAGNOSIS — Z98.890 POSTOPERATIVE STATE: ICD-10-CM

## 2019-05-06 DIAGNOSIS — C18.6 MALIGNANT NEOPLASM OF DESCENDING COLON (H): Primary | ICD-10-CM

## 2019-05-06 PROCEDURE — 99024 POSTOP FOLLOW-UP VISIT: CPT | Performed by: SURGERY

## 2019-05-06 PROCEDURE — G0463 HOSPITAL OUTPT CLINIC VISIT: HCPCS

## 2019-05-06 ASSESSMENT — PAIN SCALES - GENERAL: PAINLEVEL: MILD PAIN (2)

## 2019-05-06 NOTE — NURSING NOTE
"Chief Complaint   Patient presents with     Surgical Followup     Colon cancer       Initial /84 (BP Location: Right arm, Patient Position: Sitting, Cuff Size: Adult Large)   Pulse 104   Temp 97.8  F (36.6  C) (Temporal)   Resp 18   Wt 120.5 kg (265 lb 9.6 oz)   BMI 38.11 kg/m   Estimated body mass index is 38.11 kg/m  as calculated from the following:    Height as of 4/23/19: 1.778 m (5' 10\").    Weight as of this encounter: 120.5 kg (265 lb 9.6 oz).  Medication Reconciliation: complete    Amira Miller LPN  "

## 2019-05-06 NOTE — PROGRESS NOTES
Sunday Ramírez presents to clinic for follow-up of left colectomy.  Patient is doing well.  He is tolerating regular diet and bowel movements have normalized.  He denies blood in his stools.  Patient says his pain is well controlled at home.  Is not taking narcotics and he went fishing the other day.  Patient denies problems with the incision.  No drainage, no bleeding, no concerns for infection.  Patient denies fevers chills his energy is good.    /84 (BP Location: Right arm, Patient Position: Sitting, Cuff Size: Adult Large)   Pulse 104   Temp 97.8  F (36.6  C) (Temporal)   Resp 18   Wt 120.5 kg (265 lb 9.6 oz)   BMI 38.11 kg/m      Upper midline incision is clean dry and intact.  There is no bleeding or drainage.  There is some erythema and induration at the lower aspect of the incision.  Some eschar was removed and the wound was probed with a sterile applicator.  A seroma was found and drained.  No evidence of abscess or infection.  Fascia feels intact      Sunday Ramírez is a 43-year-old male status post laparoscopic-assisted left hemicolectomy for stage IIB, T4b/N0/M0, colon cancer. The patient had a seroma that was drained in clinic today, no other signs of infection to warrant antibiotics.  The patient and his wife were instructed to monitor the redness of the patient's abdomen and if he gets worse to call the clinic and we will start an antibiotic at this time.    No further scheduled surgical follow-up is necessary.    Colonoscopy in one year   The patient will be referred to Dr Spence of the heme/onc service for consideration of chemotherapy       JEMIMA Tinoco MD

## 2020-09-23 ENCOUNTER — HOSPITAL ENCOUNTER (EMERGENCY)
Facility: HOSPITAL | Age: 45
Discharge: HOME | End: 2020-09-23
Attending: NURSE PRACTITIONER | Admitting: NURSE PRACTITIONER
Payer: COMMERCIAL

## 2020-09-23 VITALS
OXYGEN SATURATION: 99 % | RESPIRATION RATE: 16 BRPM | HEART RATE: 89 BPM | SYSTOLIC BLOOD PRESSURE: 148 MMHG | TEMPERATURE: 97.6 F | DIASTOLIC BLOOD PRESSURE: 96 MMHG

## 2020-09-23 DIAGNOSIS — H57.12 LEFT EYE PAIN: ICD-10-CM

## 2020-09-23 PROCEDURE — 99213 OFFICE O/P EST LOW 20 MIN: CPT | Mod: Z6 | Performed by: NURSE PRACTITIONER

## 2020-09-23 PROCEDURE — G0463 HOSPITAL OUTPT CLINIC VISIT: HCPCS

## 2020-09-23 PROCEDURE — 25000125 ZZHC RX 250: Performed by: NURSE PRACTITIONER

## 2020-09-23 RX ORDER — TETRACAINE HYDROCHLORIDE 5 MG/ML
1-2 SOLUTION OPHTHALMIC ONCE
Status: COMPLETED | OUTPATIENT
Start: 2020-09-23 | End: 2020-09-23

## 2020-09-23 RX ADMIN — TETRACAINE HYDROCHLORIDE 2 DROP: 5 SOLUTION OPHTHALMIC at 10:13

## 2020-09-23 ASSESSMENT — ENCOUNTER SYMPTOMS
EYE REDNESS: 1
EYE PAIN: 1
EYE DISCHARGE: 0

## 2020-09-23 NOTE — ED AVS SNAPSHOT
[facility]  [address] [address]  Phone:  [phone]                                    Sunday Ramírez   MRN: [id number]    Department:  [facility]   Date of Visit:  9/23/2020           After Visit Summary Signature Page    I have received my discharge instructions, and my questions have been answered. I have discussed any challenges I see with this plan with the nurse or doctor.    ..........................................................................................................................................  Patient/Patient Representative Signature      ..........................................................................................................................................  Patient Representative Print Name and Relationship to Patient    ..................................................               ................................................  Date                                   Time    ..........................................................................................................................................  Reviewed by Signature/Title    ...................................................              ..............................................  Date                                               Time          22EPIC Rev 08/18

## 2020-09-23 NOTE — ED TRIAGE NOTES
Patient presents with complaints of left eye pain.  States he woke up one week ago with eye pain; states he has some clear thick drainage.  Notes he thought maybe he had gotten something stuck in his eye so he covered his eye for a few days.  Notes he felt like this was making it better.  Then this AM and he woke up with more pain, redness and blurred vision.

## 2020-09-23 NOTE — ED PROVIDER NOTES
History     Chief Complaint   Patient presents with     Eye Pain     The history is provided by the patient.     Sunday Ramírez is a 45 year old male who with reports of L eye redness and blurred vision of L eye. Reports pain with touching eye and light sensitivity. States redness began one week ago, mostly resolved, and then worsened again yesterday. Denies any known injury. States he recently has needed cheaters for reading.     Allergies:  No Known Allergies    Problem List:    Patient Active Problem List    Diagnosis Date Noted     Colon cancer (H) 2019     Priority: Medium     Anxiety state 2018     Priority: Medium     Right carpal tunnel syndrome 2015     Priority: Medium     Trigger finger, left 2015     Priority: Medium        Past Medical History:    Past Medical History:   Diagnosis Date     Colonic mass 04/10/2019     Injury of left forearm      Uncomplicated asthma        Past Surgical History:    Past Surgical History:   Procedure Laterality Date     COLECTOMY LEFT N/A 2019    Procedure: COLECTOMY, LEFT;  Surgeon: Alexsander Tinoco MD;  Location: [facility] OR     COLONOSCOPY  04/10/2019    sigmoid mass     COLONOSCOPY N/A 4/10/2019    colon cancer, follow up in 1 year, 4/10/20       Family History:    Family History   Problem Relation Age of Onset     Other - See Comments Mother         Emphyzema       Social History:  Marital Status:   [2]  Social History     Tobacco Use     Smoking status: Former Smoker     Packs/day: 1.00     Types: Cigarettes     Last attempt to quit: 2015     Years since quittin.7     Smokeless tobacco: Current User     Types: Chew   Substance Use Topics     Alcohol use: No     Drug use: No     Types: Other     Comment: Drug use: No        Medications:    aspirin-acetaminophen-caffeine (EXCEDRIN MIGRAINE) 250-250-65 MG tablet  acetaminophen (TYLENOL) 500 MG tablet  ibuprofen (ADVIL/MOTRIN) 200 MG tablet          Review of Systems   Eyes:  Positive for pain, redness and visual disturbance. Negative for discharge.   All other systems reviewed and are negative.      Physical Exam   BP: 148/96  Pulse: 89  Temp: 97.6  F (36.4  C)  Resp: 16  SpO2: 99 %      Physical Exam  Vitals signs and nursing note reviewed.   Constitutional:       Appearance: Normal appearance. He is not ill-appearing or toxic-appearing.   HENT:      Head: Normocephalic.   Eyes:      General: Lids are normal. Lids are everted, no foreign bodies appreciated. No visual field deficit.        Left eye: No foreign body, discharge or hordeolum.      Extraocular Movements: Extraocular movements intact.      Left eye: Normal extraocular motion and no nystagmus.      Conjunctiva/sclera:      Right eye: No hemorrhage.     Left eye: Left conjunctiva is injected. No chemosis, exudate or hemorrhage.     Pupils: Pupils are equal, round, and reactive to light.      Comments: No fluorescein uptake.  Left eye vision 20/200  Right eye vision 20/25   Neck:      Musculoskeletal: Neck supple.   Cardiovascular:      Rate and Rhythm: Normal rate.   Pulmonary:      Effort: Pulmonary effort is normal.   Skin:     General: Skin is warm and dry.      Capillary Refill: Capillary refill takes less than 2 seconds.   Neurological:      Mental Status: He is alert and oriented to person, place, and time.         ED Course        Procedures                 No results found for this or any previous visit (from the past 24 hour(s)).    Medications   tetracaine (PONTOCAINE) 0.5 % ophthalmic solution 1-2 drop (2 drops Left Eye Given 9/23/20 1013)       Assessments & Plan (with Medical Decision Making)     I have reviewed the nursing notes.    I have reviewed the findings, diagnosis, plan and need for follow up with the patient.  Assessment:  Ddx: Conjunctivitis, corneal abrasion, foreign body to left eye, uveitis  Erythematous left eye. No discharge.  No fluorescein uptake. No foreign body identified. Denies pain at rest.  Significant blurred vision of affected eye.    Plan:  Unsure the cause of patient's pain and significant decreased vision to left eye.  Patient will need further evaluation of his eye. Refer to [facility] - appointment set for 1310 today.        Final diagnoses:   Left eye pain       9/23/2020   Ting Crawford NP student  [facility]       ATTESTATION:  I have seen this patient with the student. The student documented the visit and I have edited and verified the history, physical examination, assessment and plan. The examination and medical decision making was performed by me.      Marj Colin, CNP  09/25/20 2012

## 2020-09-23 NOTE — ED TRIAGE NOTES
Physical Therapy Daily Treatment     Visit Count: 4  Plan of Care Dates: Initial: 1/25/2018 Through: 4/19/2018  Insurance Information: THERAPY BENEFITS:  PAYOR: MANE/DOMO  VISIT LIMIT: 20 VISITS PER CALENDAR YEAR  AUTHORIZATION NEEDED: NO     Next Referring Provider Visit: 2/13/18     Referred by: Johnathon Hemphill MD  Medical Diagnosis (from order):V43.65 (ICD-9-CM) - Z96.652 (ICD-10-CM) - Status post total left knee replacement    Insurance: 1. ANTHCONCEPCION/DOMO  2. N/A     Date of Surgery: 1/3/18; Surgery performed: left total knee replacement; Physician Guidelines: no  Diagnosis Precautions: none  Chart reviewed: Relevant co-morbidities, allergies, tests and medications: none      SUBJECTIVE   Mildly sore this date, otherwise feels fine.  Current Pain: 3/10.    Functional Change: Walking is easier.  OBJECTIVE   L=Left      R=Right B= bilateral LE/UE = lower/upper extremity  AA = Active Assisted  A = Active   WB = weight bearing  UT = Upper trapezius  LS = levator scapulae  PA = posterior anterior  # = pound   Db= dumbbell   M/L = medial/lateral  QS = quadriceps set  SLR = Straight Leg Raise ITB= IT Band  PPT = Posterior Pelvic Tilt BKFO= Bent knee fall out Ab/abd = abdominal   QL= quadratus lumborum HS = hamstring(s)  LTR = lower trunk rotation S/DKTC = single/double knee to chest  SLS = Single leg stance  SB = swiss/stability ball  LOB = Loss of balance  MET = Muscle Energy Technique  (HEP) Instructed on exercises and issued for HEP    Range of Motion (degrees)    Norm Left Right Left Left    Date   Initial Initial 1/29/18 1/6/18    Knee Flexion 135  100 111 102 100    Knee Extension 0-5 -2 2 0 0    standard testing positions unless otherwise noted; Key: ranges are reported in active range of motion unless noted as AA=active assistive or P=passive range of motion, * denotes pain   Comments: Only those motions that were assessed are noted.    Gait - pt has single point cane - ambulates with increased ER at R  Pt states he woke up with an irrigated left eye about a week ago. Pt states his morning is the worst it's ever been. Pt states its light sensitive and hurts.     hip/LE    Treatment   Therapeutic Exercise:   -Upright bike level 1 x 5 minutes, seat 6, able to perform full revolutions, about 3 minutes to achieve full revolution  -Standing bilateral calf stretch on 2 inch step 2 x 30 seconds  -Supine glute bridges with hip ADD wedge 10 x 5 second holds  -Side-lying clam shells with yellow band x 15 LLE and RLE, cues for abdominal bracing and positioning    Therapeutic Activity:   -Step ups - 6 inch step -cues for proper weight shift forward for up, for improving L quad eccentric lowering  -Step downs 4 inch step, cues to keep heel on the step  --Improved control after cueing    Neuromuscular Re-education:   -1/2 foam roller flat and dome side anterior/posterior and medial/lateral; bilateral lower extremity intermittent upper extremity assist   -Tilt board anterior/posterior and medial/lateral both static and dynamic, intermittent upper extremity assist    Gait Training:  N/A this date     Manual Therapy:   N/a    Therapeutic Activity:  n/a       Current Home Program (not performed this date except as noted above):   Home Exercise Program:  Exercise: Date issued Comments   Supine extension stretch  seated flexion   long sitting hip ADD with QS  straight leg raise with QS  seated hamstring stretch 1/25/18    Bridges  Clam shells with yellow band 1/29/18                     ASSESSMENT   DOS: 1/3/18  Progressing well with AROM, strengthening and balance. Improved step up/down after repetition and cueing.    Pain after treatment: not reported/10  Result of above outlined education: Verbalizes understanding, Demonstrates understanding and Needs reinforcement    Goals:       To be obtained by end of this plan of care:  1. Patient independent with modified and progressed home exercise program.  2. Patient will decrease involved knee pain/symptoms to 0/10  to aid in age appropriate activities.   3. Patient will increase involved knee active range of motion to 0-110° to aid in completing  transfers including low and soft surfaces.   4. Patient will increase involved knee strength to 5/5 to aid in completion of household tasks for independent living.  5. Patient will be able to ambulate modified independent/independent with no assistive device for 30 minutes without  pain/difficulty to improve function in grocery shopping, ambulate to physician appointments and community interaction.  6. Patient will be able to ambulate 4 steps independent/modified independent for home access without  pain/difficulty.  7. Patient will be able to complete transfers independently including toilet and car without  Pain/difficulty.  8. Lower Extremity Functional Scale: Patient will complete form to reflect an improved score from initial score of 29 to greater than or equal to 40 (0=extreme difficulty; 80=no difficulty) to indicate pt reported improvement in function/disability/impairment (minimal detectable change: 9 points).     PLAN   Progress to standing hip ABD and wall slides  Add ITB towel mobilization    THERAPY DAILY BILLING   Primary Insurance: [employer]  Secondary Insurance: N/A    Evaluation Procedures:  No evaluation codes were used on this date of service    Timed Procedures:  Neuromuscular Re-Education, 10 minutes  Therapeutic Activity, 10 minutes  Therapeutic Exercise, 25 minutes    Untimed Procedures:  No untimed codes were used on this date of service    Total Treatment Time: 45 minutes

## 2020-11-11 ENCOUNTER — HOSPITAL ENCOUNTER (EMERGENCY)
Facility: HOSPITAL | Age: 45
Discharge: HOME | End: 2020-11-11
Attending: EMERGENCY MEDICINE | Admitting: EMERGENCY MEDICINE
Payer: COMMERCIAL

## 2020-11-11 VITALS
RESPIRATION RATE: 18 BRPM | OXYGEN SATURATION: 100 % | HEART RATE: 81 BPM | DIASTOLIC BLOOD PRESSURE: 108 MMHG | SYSTOLIC BLOOD PRESSURE: 149 MMHG | TEMPERATURE: 98.4 F

## 2020-11-11 DIAGNOSIS — K02.9 DENTAL CARIES: ICD-10-CM

## 2020-11-11 DIAGNOSIS — K08.89 PAIN, DENTAL: Primary | ICD-10-CM

## 2020-11-11 PROCEDURE — 99283 EMERGENCY DEPT VISIT LOW MDM: CPT | Performed by: EMERGENCY MEDICINE

## 2020-11-11 PROCEDURE — 99283 EMERGENCY DEPT VISIT LOW MDM: CPT

## 2020-11-11 RX ORDER — HYDROCODONE BITARTRATE AND ACETAMINOPHEN 5; 325 MG/1; MG/1
1 TABLET ORAL EVERY 6 HOURS PRN
Qty: 18 TABLET | Refills: 0 | Status: SHIPPED | OUTPATIENT
Start: 2020-11-11 | End: 2020-11-16

## 2020-11-11 ASSESSMENT — ENCOUNTER SYMPTOMS
FEVER: 0
ABDOMINAL PAIN: 0
SHORTNESS OF BREATH: 0

## 2020-11-11 NOTE — ED PROVIDER NOTES
History     Chief Complaint   Patient presents with     Dental Pain     to upper teeth. states he has a dental appt on . tylenol and ibu without relief      HPI  Sunday Ramírez is a 45 year old male who presented to the emergency department with complaints of dental pain on and off for more than a month.  The pain worsened last night and he did not sleep well.  He has an appointment to see a dentist on  and is requesting some treatment before the appointment.  He denies any facial swelling, fever, chills, difficulty breathing or swallowing.    Allergies:  No Known Allergies    Problem List:    Patient Active Problem List    Diagnosis Date Noted     Colon cancer (H) 2019     Priority: Medium     Anxiety state 2018     Priority: Medium     Right carpal tunnel syndrome 2015     Priority: Medium     Trigger finger, left 2015     Priority: Medium        Past Medical History:    Past Medical History:   Diagnosis Date     Colonic mass 04/10/2019     Injury of left forearm      Uncomplicated asthma        Past Surgical History:    Past Surgical History:   Procedure Laterality Date     COLECTOMY LEFT N/A 2019    Procedure: COLECTOMY, LEFT;  Surgeon: Alexsander Tinoco MD;  Location: [facility] OR     COLONOSCOPY  04/10/2019    sigmoid mass     COLONOSCOPY N/A 4/10/2019    colon cancer, follow up in 1 year, 4/10/20       Family History:    Family History   Problem Relation Age of Onset     Other - See Comments Mother         Emphyzema       Social History:  Marital Status:   [2]  Social History     Tobacco Use     Smoking status: Former Smoker     Packs/day: 1.00     Types: Cigarettes     Quit date: 2015     Years since quittin.8     Smokeless tobacco: Current User     Types: Chew   Substance Use Topics     Alcohol use: No     Drug use: No     Types: Other     Comment: Drug use: No        Medications:         acetaminophen (TYLENOL) 500 MG tablet        amoxicillin-clavulanate (AUGMENTIN) 875-125 MG tablet       HYDROcodone-acetaminophen (NORCO) 5-325 MG tablet       ibuprofen (ADVIL/MOTRIN) 200 MG tablet       aspirin-acetaminophen-caffeine (EXCEDRIN MIGRAINE) 250-250-65 MG tablet          Review of Systems   Constitutional: Negative for fever.   Respiratory: Negative for shortness of breath.    Cardiovascular: Negative for chest pain.   Gastrointestinal: Negative for abdominal pain.   All other systems reviewed and are negative.      Physical Exam   BP: (!) 149/108  Pulse: 81  Temp: 98.4  F (36.9  C)  Resp: 18  SpO2: 100 %      Physical Exam  Vitals signs and nursing note reviewed.   Constitutional:       General: He is not in acute distress.     Appearance: He is well-developed. He is not diaphoretic.   HENT:      Head: Normocephalic and atraumatic.      Mouth/Throat:     Cardiovascular:      Rate and Rhythm: Normal rate and regular rhythm.      Heart sounds: Normal heart sounds.   Pulmonary:      Effort: Pulmonary effort is normal. No respiratory distress.      Breath sounds: Normal breath sounds. No stridor.   Abdominal:      General: Bowel sounds are normal. There is no distension.      Tenderness: There is no abdominal tenderness.   Neurological:      Mental Status: He is alert.         ED Course       Procedures      No results found for this or any previous visit (from the past 24 hour(s)).    Medications - No data to display    Assessments & Plan (with Medical Decision Making)   Infected dental caries: Started on Augmentin and Norco as needed for pain.  Keep appointment with dentist on November 18.  Return to ED if you develop fever, facial swelling or worsening of condition.      I have reviewed the nursing notes.    I have reviewed the findings, diagnosis, plan and need for follow up with the patient.    New Prescriptions    AMOXICILLIN-CLAVULANATE (AUGMENTIN) 875-125 MG TABLET    Take 1 tablet by mouth 2 times daily for 7 days     HYDROCODONE-ACETAMINOPHEN (NORCO) 5-325 MG TABLET    Take 1 tablet by mouth every 6 hours as needed for pain       Final diagnoses:   Dental caries   Pain, dental       11/11/2020   [facility]     Blas Andino MD  11/11/20 7225

## 2020-11-11 NOTE — ED AVS SNAPSHOT
[facility]  [address] [address]  Phone: [phone]                                    Sunday Ramírez   MRN: [id number]    Department: [facility]   Date of Visit: 11/11/2020           After Visit Summary Signature Page    I have received my discharge instructions, and my questions have been answered. I have discussed any challenges I see with this plan with the nurse or doctor.    ..........................................................................................................................................  Patient/Patient Representative Signature      ..........................................................................................................................................  Patient Representative Print Name and Relationship to Patient    ..................................................               ................................................  Date                                   Time    ..........................................................................................................................................  Reviewed by Signature/Title    ...................................................              ..............................................  Date                                               Time          22EPIC Rev 08/18

## 2020-11-11 NOTE — ED NOTES
Discharge instructions reviewed with patient, and patient verbalized understanding. [facility] [place]. Pt ambulatory to exit.

## 2020-11-11 NOTE — ED NOTES
Patient presents with complaints of 1 week of worsening dental pain.  States it is his right upper molar and two incisors.  Notes he has a dental appointment on 11/18/2020.

## 2021-06-07 ENCOUNTER — HOSPITAL ENCOUNTER (EMERGENCY)
Facility: HOSPITAL | Age: 46
Discharge: HOME | End: 2021-06-07
Attending: NURSE PRACTITIONER | Admitting: NURSE PRACTITIONER
Payer: COMMERCIAL

## 2021-06-07 VITALS
DIASTOLIC BLOOD PRESSURE: 85 MMHG | HEART RATE: 88 BPM | SYSTOLIC BLOOD PRESSURE: 124 MMHG | RESPIRATION RATE: 16 BRPM | OXYGEN SATURATION: 97 % | TEMPERATURE: 98.1 F

## 2021-06-07 DIAGNOSIS — S69.90XA FISH HOOK INJURY OF FINGER: Primary | ICD-10-CM

## 2021-06-07 DIAGNOSIS — S69.91XA FISH HOOK INJURY OF FINGER OF RIGHT HAND, INITIAL ENCOUNTER: ICD-10-CM

## 2021-06-07 PROCEDURE — 90471 IMMUNIZATION ADMIN: CPT | Performed by: NURSE PRACTITIONER

## 2021-06-07 PROCEDURE — 250N000011 HC RX IP 250 OP 636: Performed by: NURSE PRACTITIONER

## 2021-06-07 PROCEDURE — 90715 TDAP VACCINE 7 YRS/> IM: CPT | Performed by: NURSE PRACTITIONER

## 2021-06-07 PROCEDURE — 10120 INC&RMVL FB SUBQ TISS SMPL: CPT | Performed by: NURSE PRACTITIONER

## 2021-06-07 PROCEDURE — 10120 INC&RMVL FB SUBQ TISS SMPL: CPT

## 2021-06-07 PROCEDURE — 999N000104 HC STATISTIC NO CHARGE

## 2021-06-07 RX ADMIN — CLOSTRIDIUM TETANI TOXOID ANTIGEN (FORMALDEHYDE INACTIVATED), CORYNEBACTERIUM DIPHTHERIAE TOXOID ANTIGEN (FORMALDEHYDE INACTIVATED), BORDETELLA PERTUSSIS TOXOID ANTIGEN (GLUTARALDEHYDE INACTIVATED), BORDETELLA PERTUSSIS FILAMENTOUS HEMAGGLUTININ ANTIGEN (FORMALDEHYDE INACTIVATED), BORDETELLA PERTUSSIS PERTACTIN ANTIGEN, AND BORDETELLA PERTUSSIS FIMBRIAE 2/3 ANTIGEN 0.5 ML: 5; 2; 2.5; 5; 3; 5 INJECTION, SUSPENSION INTRAMUSCULAR at 21:04

## 2021-06-07 ASSESSMENT — ENCOUNTER SYMPTOMS
WOUND: 1
CHILLS: 0
SHORTNESS OF BREATH: 0
DIARRHEA: 0
FEVER: 0
VOMITING: 0
PSYCHIATRIC NEGATIVE: 1
NAUSEA: 0

## 2021-06-08 NOTE — ED PROVIDER NOTES
History     Chief Complaint   Patient presents with     Puncture Wound     HPI  Sunday Ramírez is a 45 year old male who presents to urgent care today with complaints of fish hook to left thumb.  Incident occurred last night around 2200.  Denies pain unless it gets bumped.  No signs of infection.  Denies fever, chills, nausea, vomiting, diarrhea, SOB or chest pain.  FULL ROM.  No other concerns.     Allergies:  No Known Allergies    Problem List:    Patient Active Problem List    Diagnosis Date Noted     Colon cancer (H) 2019     Priority: Medium     Anxiety state 2018     Priority: Medium     Right carpal tunnel syndrome 2015     Priority: Medium     Trigger finger, left 2015     Priority: Medium        Past Medical History:    Past Medical History:   Diagnosis Date     Colonic mass 04/10/2019     Injury of left forearm      Uncomplicated asthma        Past Surgical History:    Past Surgical History:   Procedure Laterality Date     COLECTOMY LEFT N/A 2019    Procedure: COLECTOMY, LEFT;  Surgeon: Alexsander Tinoco MD;  Location: [facility]     COLONOSCOPY  04/10/2019    sigmoid mass     COLONOSCOPY N/A 4/10/2019    colon cancer, follow up in 1 year, 4/10/20       Family History:    Family History   Problem Relation Age of Onset     Other - See Comments Mother         Emphyzema       Social History:  Marital Status:   [2]  Social History     Tobacco Use     Smoking status: Former Smoker     Packs/day: 1.00     Types: Cigarettes     Quit date: 2015     Years since quittin.4     Smokeless tobacco: Current User     Types: Chew   Substance Use Topics     Alcohol use: No     Drug use: No     Types: Other     Comment: Drug use: No        Medications:    aspirin-acetaminophen-caffeine (EXCEDRIN MIGRAINE) 250-250-65 MG tablet  ibuprofen (ADVIL/MOTRIN) 200 MG tablet  acetaminophen (TYLENOL) 500 MG tablet      Review of Systems   Constitutional: Negative for chills and fever.    Respiratory: Negative for shortness of breath.    Cardiovascular: Negative for chest pain.   Gastrointestinal: Negative for diarrhea, nausea and vomiting.   Musculoskeletal: Negative for gait problem.   Skin: Positive for wound (left thumb fish hook).   Psychiatric/Behavioral: Negative.      Physical Exam   BP: 124/85  Pulse: 88  Temp: 98.1  F (36.7  C)  Resp: 16  SpO2: 97 %    Physical Exam  Vitals signs and nursing note reviewed.   Constitutional:       General: He is not in acute distress.     Appearance: He is not ill-appearing.   Cardiovascular:      Rate and Rhythm: Normal rate and regular rhythm.      Pulses: Normal pulses.      Heart sounds: Normal heart sounds.   Pulmonary:      Effort: Pulmonary effort is normal.      Breath sounds: Normal breath sounds.   Skin:     General: Skin is warm and dry.      Capillary Refill: Capillary refill takes less than 2 seconds.      Comments: Fish hook stuck in left thumb   Neurological:      Mental Status: He is alert.   Psychiatric:         Mood and Affect: Mood normal.       ED Course       No results found for this or any previous visit (from the past 24 hour(s)).    Medications   Tdap (tetanus-diphtheria-acell pertussis) (ADACEL) injection 0.5 mL (0.5 mLs Intramuscular Given 6/7/21 2104)       Assessments & Plan (with Medical Decision Making)     I have reviewed the nursing notes.    I have reviewed the findings, diagnosis, plan and need for follow up with the patient.  (S69.90XA) Fish hook injury of finger  (primary encounter diagnosis)  Plan:   Fish hook to left thumb.  Denies current pain.  Soaked in hibiclens and rinsed with NS.  Placed digital block at base of thumb with 1% lidocaine.  Fish hook removed.  No active bleeding.  FULL ROM.  No signs of infection.  Patient tolerated procedure well without difficulty.  Patient in agreement with treatment plan and discharged home.  Patient to return to urgent care/ED with any additional questions or concerns.      Discharge Medication List as of 6/7/2021  9:05 PM          Final diagnoses:   Fish hook injury of finger     6/7/2021   [facility]     Sandra Cárdenas NP  06/09/21 0605

## 2021-06-08 NOTE — ED TRIAGE NOTES
Pt presented with a fish hook in left thumb since last night. Pt has only taken ibuprofen. Pt states it doesn't hurt unless he messes with it. Pt states he soaked thumb in alcohol.

## 2022-06-27 ENCOUNTER — APPOINTMENT (OUTPATIENT)
Dept: GENERAL RADIOLOGY | Facility: HOSPITAL | Age: 47
End: 2022-06-27
Attending: STUDENT IN AN ORGANIZED HEALTH CARE EDUCATION/TRAINING PROGRAM
Payer: COMMERCIAL

## 2022-06-27 ENCOUNTER — APPOINTMENT (OUTPATIENT)
Dept: CT IMAGING | Facility: HOSPITAL | Age: 47
End: 2022-06-27
Attending: NURSE PRACTITIONER
Payer: COMMERCIAL

## 2022-06-27 ENCOUNTER — HOSPITAL ENCOUNTER (EMERGENCY)
Facility: HOSPITAL | Age: 47
Discharge: HOME | End: 2022-06-27
Attending: NURSE PRACTITIONER | Admitting: NURSE PRACTITIONER
Payer: COMMERCIAL

## 2022-06-27 VITALS
HEART RATE: 80 BPM | RESPIRATION RATE: 16 BRPM | DIASTOLIC BLOOD PRESSURE: 79 MMHG | TEMPERATURE: 97.3 F | SYSTOLIC BLOOD PRESSURE: 132 MMHG | OXYGEN SATURATION: 98 %

## 2022-06-27 DIAGNOSIS — S93.04XA CLOSED DISLOCATION OF RIGHT TALUS, INITIAL ENCOUNTER: ICD-10-CM

## 2022-06-27 DIAGNOSIS — S92.251A: ICD-10-CM

## 2022-06-27 LAB — SARS-COV-2 RNA RESP QL NAA+PROBE: NEGATIVE

## 2022-06-27 PROCEDURE — 999N000157 HC STATISTIC RCP TIME EA 10 MIN

## 2022-06-27 PROCEDURE — 99152 MOD SED SAME PHYS/QHP 5/>YRS: CPT | Performed by: STUDENT IN AN ORGANIZED HEALTH CARE EDUCATION/TRAINING PROGRAM

## 2022-06-27 PROCEDURE — U0005 INFEC AGEN DETEC AMPLI PROBE: HCPCS | Performed by: STUDENT IN AN ORGANIZED HEALTH CARE EDUCATION/TRAINING PROGRAM

## 2022-06-27 PROCEDURE — 73700 CT LOWER EXTREMITY W/O DYE: CPT | Mod: RT

## 2022-06-27 PROCEDURE — 99152 MOD SED SAME PHYS/QHP 5/>YRS: CPT

## 2022-06-27 PROCEDURE — 73610 X-RAY EXAM OF ANKLE: CPT | Mod: RT

## 2022-06-27 PROCEDURE — 99285 EMERGENCY DEPT VISIT HI MDM: CPT | Mod: 25

## 2022-06-27 PROCEDURE — 250N000011 HC RX IP 250 OP 636: Performed by: STUDENT IN AN ORGANIZED HEALTH CARE EDUCATION/TRAINING PROGRAM

## 2022-06-27 PROCEDURE — 250N000013 HC RX MED GY IP 250 OP 250 PS 637: Performed by: NURSE PRACTITIONER

## 2022-06-27 PROCEDURE — 27840 TREAT ANKLE DISLOCATION: CPT | Mod: RT

## 2022-06-27 PROCEDURE — 96372 THER/PROPH/DIAG INJ SC/IM: CPT | Mod: XU | Performed by: NURSE PRACTITIONER

## 2022-06-27 PROCEDURE — 999N000065 XR ANKLE RT 2 VW

## 2022-06-27 PROCEDURE — 250N000011 HC RX IP 250 OP 636: Performed by: NURSE PRACTITIONER

## 2022-06-27 PROCEDURE — 27840 TREAT ANKLE DISLOCATION: CPT | Mod: 54 | Performed by: STUDENT IN AN ORGANIZED HEALTH CARE EDUCATION/TRAINING PROGRAM

## 2022-06-27 PROCEDURE — 99284 EMERGENCY DEPT VISIT MOD MDM: CPT | Mod: 25 | Performed by: STUDENT IN AN ORGANIZED HEALTH CARE EDUCATION/TRAINING PROGRAM

## 2022-06-27 PROCEDURE — C9803 HOPD COVID-19 SPEC COLLECT: HCPCS

## 2022-06-27 RX ORDER — ACETAMINOPHEN 325 MG/1
325 TABLET ORAL ONCE
Status: COMPLETED | OUTPATIENT
Start: 2022-06-27 | End: 2022-06-27

## 2022-06-27 RX ORDER — HYDROCODONE BITARTRATE AND ACETAMINOPHEN 5; 325 MG/1; MG/1
1 TABLET ORAL ONCE
Status: COMPLETED | OUTPATIENT
Start: 2022-06-27 | End: 2022-06-27

## 2022-06-27 RX ORDER — HYDROMORPHONE HYDROCHLORIDE 1 MG/ML
0.5 INJECTION, SOLUTION INTRAMUSCULAR; INTRAVENOUS; SUBCUTANEOUS EVERY 30 MIN PRN
Status: DISCONTINUED | OUTPATIENT
Start: 2022-06-27 | End: 2022-06-27 | Stop reason: HOSPADM

## 2022-06-27 RX ORDER — PROPOFOL 10 MG/ML
60 INJECTION, EMULSION INTRAVENOUS ONCE
Status: COMPLETED | OUTPATIENT
Start: 2022-06-27 | End: 2022-06-27

## 2022-06-27 RX ORDER — PROPOFOL 10 MG/ML
30 INJECTION, EMULSION INTRAVENOUS
Status: DISCONTINUED | OUTPATIENT
Start: 2022-06-27 | End: 2022-06-27 | Stop reason: HOSPADM

## 2022-06-27 RX ORDER — OXYCODONE HYDROCHLORIDE 5 MG/1
5 TABLET ORAL EVERY 6 HOURS PRN
Qty: 8 TABLET | Refills: 0 | Status: SHIPPED | OUTPATIENT
Start: 2022-06-27 | End: 2022-06-30

## 2022-06-27 RX ORDER — TIZANIDINE 2 MG/1
2 TABLET ORAL ONCE
Status: COMPLETED | OUTPATIENT
Start: 2022-06-27 | End: 2022-06-27

## 2022-06-27 RX ORDER — KETOROLAC TROMETHAMINE 30 MG/ML
30 INJECTION, SOLUTION INTRAMUSCULAR; INTRAVENOUS ONCE
Status: COMPLETED | OUTPATIENT
Start: 2022-06-27 | End: 2022-06-27

## 2022-06-27 RX ADMIN — PROPOFOL 20 MG: 10 INJECTION, EMULSION INTRAVENOUS at 13:49

## 2022-06-27 RX ADMIN — TIZANIDINE 2 MG: 2 TABLET ORAL at 10:40

## 2022-06-27 RX ADMIN — HYDROMORPHONE HYDROCHLORIDE 0.5 MG: 1 INJECTION, SOLUTION INTRAMUSCULAR; INTRAVENOUS; SUBCUTANEOUS at 13:24

## 2022-06-27 RX ADMIN — PROPOFOL 30 MG: 10 INJECTION, EMULSION INTRAVENOUS at 13:51

## 2022-06-27 RX ADMIN — PROPOFOL 50 MG: 10 INJECTION, EMULSION INTRAVENOUS at 13:47

## 2022-06-27 RX ADMIN — KETOROLAC TROMETHAMINE 30 MG: 30 INJECTION, SOLUTION INTRAMUSCULAR; INTRAVENOUS at 10:57

## 2022-06-27 RX ADMIN — HYDROMORPHONE HYDROCHLORIDE 0.5 MG: 1 INJECTION, SOLUTION INTRAMUSCULAR; INTRAVENOUS; SUBCUTANEOUS at 12:42

## 2022-06-27 RX ADMIN — ACETAMINOPHEN 325 MG: 325 TABLET ORAL at 12:03

## 2022-06-27 RX ADMIN — PROPOFOL 30 MG: 10 INJECTION, EMULSION INTRAVENOUS at 13:53

## 2022-06-27 RX ADMIN — HYDROCODONE BITARTRATE AND ACETAMINOPHEN 1 TABLET: 5; 325 TABLET ORAL at 12:03

## 2022-06-27 ASSESSMENT — ENCOUNTER SYMPTOMS
CHILLS: 0
ACTIVITY CHANGE: 1
WOUND: 1
NAUSEA: 0
NUMBNESS: 0
FEVER: 0
VOMITING: 0

## 2022-06-27 NOTE — PROGRESS NOTES
Attended conscious sedation. Placed on nasal cannula 4 lpm with continuous EtCo2 monitoring.  Spo2 %, EtCo2 33-36, RR  12-18/min. Tolerated well.

## 2022-06-27 NOTE — DISCHARGE INSTRUCTIONS
- Please take Tylenol, Ibuprofen, and Oxycodone for your symptoms  - Please return to the Emergency Room if you do not improve, feel worse, or have any new or concerning symptoms. We would especially want to see you back if you experience loss of sensation or worsening pain in the foot  - Please follow up with orthopedic surgery in 2 days     Goal Outcome Evaluation:  Plan of Care Reviewed With: patient, family  Progress: no change  Outcome Summary: pt extremely drowsy today, abg show elevated CO2 pt on Bipap as much as she will tolerate; insulin dose adjusted also

## 2022-06-27 NOTE — SEDATION DOCUMENTATION
Time out called at 1345    Respiratory-Haresh, Dr. Garcia, Dr. Bullock, Reyna Foster, Judy Holder @ bedside    Propofol used for conscious sedation, per MD order- See Mar.     Right ankle reduced, ankle wrapped with cast padding 4 inch orthoglass and ace wrap. CMS intact per Dr. Bullock.    Patient regaining alertness at 1401.    Provider left room at 1405.    Patient tolerated procedure well. Patient rates pain at 1/10 at this time.

## 2022-06-27 NOTE — ED PROVIDER NOTES
History     Chief Complaint   Patient presents with     Ankle Pain     HPI  Sunday Ramírez is a 47 year old male who presents with right ankle pain, swelling, and disfiguration.  Accompanied with tingling in his toes.  Injury occurred when he jumped 4 feet, out of the back of a truck, to miss being hit by a falling object.  In doing so, he jumped onto a junk pile and injured his right ankle.  No OTC medications have been taken or home interventions applied.  Quit smoking 2015.  Denies previous injuries.  Denies fevers, chills, nausea, and vomiting.    Musculoskeletal problem/pain      Duration: today    Description  Location: right ankle    Intensity:  10/10    Accompanying signs and symptoms: tingling toes. Ankle  Disfiguration in comparison to position of foot    History  Previous similar problem: no   Previous evaluation:  none    Precipitating or alleviating factors:  Trauma or overuse: YES- jumped out of back of truck to miss falling object and jumped four feet landing on other junk on the ground    Aggravating factors include: movement    Therapies tried and outcome: nothing     Allergies:  No Known Allergies    Problem List:    Patient Active Problem List    Diagnosis Date Noted     Colon cancer (H) 04/23/2019     Priority: Medium     Anxiety state 01/25/2018     Priority: Medium     Right carpal tunnel syndrome 01/30/2015     Priority: Medium     Trigger finger, left 01/30/2015     Priority: Medium        Past Medical History:    Past Medical History:   Diagnosis Date     Colonic mass 04/10/2019     Injury of left forearm      Uncomplicated asthma        Past Surgical History:    Past Surgical History:   Procedure Laterality Date     COLECTOMY LEFT N/A 4/23/2019    Procedure: COLECTOMY, LEFT;  Surgeon: Alexsander Tinoco MD;  Location: [facility] OR     COLONOSCOPY  04/10/2019    sigmoid mass     COLONOSCOPY N/A 4/10/2019    colon cancer, follow up in 1 year, 4/10/20       Family History:    Family History    Problem Relation Age of Onset     Other - See Comments Mother         Emphyzema       Social History:  Marital Status:   [2]  Social History     Tobacco Use     Smoking status: Former Smoker     Packs/day: 1.00     Types: Cigarettes     Quit date: 2015     Years since quittin.4     Smokeless tobacco: Current User     Types: Chew   Substance Use Topics     Alcohol use: No     Drug use: No     Types: Other     Comment: Drug use: No        Medications:    acetaminophen (TYLENOL) 500 MG tablet  aspirin-acetaminophen-caffeine (EXCEDRIN MIGRAINE) 250-250-65 MG tablet  ibuprofen (ADVIL/MOTRIN) 200 MG tablet          Review of Systems   Constitutional: Positive for activity change. Negative for chills and fever.   Gastrointestinal: Negative for nausea and vomiting.   Musculoskeletal:        Right ankle pain and disfiguration    Skin: Positive for wound.   Neurological: Negative for numbness (toes are tingling).       Physical Exam   BP: (!) 151/108  Pulse: 97  Temp: (!) 96.7  F (35.9  C)  Resp: 16  SpO2: 97 %      Physical Exam  Vitals and nursing note reviewed.   Constitutional:       General: He is in acute distress (Moderate to severe).      Appearance: He is overweight.   Cardiovascular:      Rate and Rhythm: Normal rate.      Pulses:           Dorsalis pedis pulses are 3+ on the right side.        Posterior tibial pulses are 3+ on the right side.   Pulmonary:      Effort: Pulmonary effort is normal.   Musculoskeletal:         General: Swelling, tenderness and signs of injury present.      Right foot: Deformity present.      Comments: Right foot/ankle   Skin:     General: Skin is warm and dry.      Findings: No bruising or erythema.   Neurological:      Mental Status: He is alert and oriented to person, place, and time.   Psychiatric:         Behavior: Behavior normal.         ED Course              ED Course as of 22 1454      1249 Paged Ortho. No answer. Left message and will try  again in 10-15 minutes.   1324 Discussed with ortho. Goal is imperfect reduction, and bulky spain dressing, and then ortho follow-up with probable surgery in 48 hours.     Procedures             Results for orders placed or performed during the hospital encounter of 06/27/22 (from the past 24 hour(s))   XR Ankle Right G/E 3 Views    Narrative    PROCEDURE:  XR ANKLE RIGHT G/E 3 VIEWS    HISTORY: Trauma.    COMPARISON:  None.    TECHNIQUE:  3 views right ankle.    FINDINGS:  The ankle is distorted. There is widening of the medial  tibiotalar joint. There is abnormal relationship of the tibia and  calcaneus. A fracture fragment is present over the medial ankle  spanning approximately 2.7 cm that appears to arise from the medial  talus rather than the more common in the medial malleolus. The  appearance is concerning for a subtalar fracture dislocation. Consider  CT for further characterization.     CARLYLE SOLANO MD         SYSTEM ID:  [id number]   CT Ankle Right w/o Contrast    Narrative    CT ANKLE RIGHT W/O CONTRAST    HISTORY: subtalar fracture dislocation. .    COMPARISON: Radiographs 6/27/2022.    TECHNIQUE: Helical noncontrast CT images of the right ankle.    FINDINGS:    There is a fracture dislocation of the talus relative to the calcaneus  as well as the navicular.     There is subtalar dislocation with subluxation of the talus anteriorly  and laterally relative to the calcaneus. The medial talus demonstrates  numerous fractures, many with comminution and displacement. One of the  fracture lines propagates into the weightbearing talar dome on series  6 image 61 and series 8 image 41. A dominant 2.1 x 1.7 cm fragment is  displaced into the posterior medial aspect of the mortise joint. There  is associated rotation of the talus relative to the tibia with  widening of the medial tibiotalar joint. No medial or lateral  malleolar fracture is seen.    Posteromedial dislocation of the navicular bone relative to  the  anterior talus is associated with subtle fracturing of the anterior  medial margin of the talus best seen on series 6 image 51 and series 8  image 21. Multiple more obvious navicular fractures are also present.     The entire mid and forefoot travels with the navicular bone and is  angled medially and subluxed medially and posteriorly relative to the  talus.    No calcaneal fracture is identified. The calcaneocuboid joint remains  congruent.      Impression    IMPRESSION:     Complex fracture dislocations of the talocalcaneal and talonavicular  joints. Numerous fractures involving the talus and navicular bones.     Notably, no malleolar or calcaneal fracture is identified and the  calcaneocuboid joint appears congruent.     Nonetheless, this trauma pattern places the patient at high risk of  posttraumatic osteoarthritis. Recommend orthopedic consultation.     CARLYLE SOLANO MD         SYSTEM ID:  [id number]       Medications   tiZANidine (ZANAFLEX) tablet 2 mg (2 mg Oral Given 6/27/22 1040)   ketorolac (TORADOL) injection 30 mg (30 mg Intramuscular Given 6/27/22 1057)   HYDROcodone-acetaminophen (NORCO) 5-325 MG per tablet 1 tablet (1 tablet Oral Given 6/27/22 1203)   acetaminophen (TYLENOL) tablet 325 mg (325 mg Oral Given 6/27/22 1203)       Assessments & Plan (with Medical Decision Making)     I have reviewed the nursing notes.    I have reviewed the findings, diagnosis, plan and need for follow up with the patient.  (S92.251A) Closed right tarsal navicular fracture  Comment: 47 year old male who presents with right ankle pain, swelling, and disfiguration.  Accompanied with tingling in his toes.  Injury occurred when he jumped 4 feet, out of the back of a truck, to miss being hit by a falling object.  In doing so, he jumped onto a junk pile and injured his right ankle.  No OTC medications have been taken or home interventions applied.  Quit smoking 2015.  Denies previous injuries.  Denies fevers, chills,  nausea, and vomiting.    MDM: right foot swollen and obvious disfiguration of foot in comparison to position of ankle. Pedal pulses 3+    Ketorolac 30 mg given IM  Tizanidine 2 mg given p.o.  Hydrocodone 5/325 mg and 1 acetaminophen 325 mg given p.o.  Continues to have discomfort    Right foot x-ray reviewed and per radiology;  The ankle is distorted. There is widening of the medial  tibiotalar joint. There is abnormal relationship of the tibia and  calcaneus. A fracture fragment is present over the medial ankle  spanning approximately 2.7 cm that appears to arise from the medial  talus rather than the more common in the medial malleolus. The  appearance is concerning for a subtalar fracture dislocation. Consider  CT for further characterization.     CT results per radiology;  Complex fracture dislocations of the talocalcaneal and talonavicular  joints. Numerous fractures involving the talus and navicular bones.   Notably, no malleolar or calcaneal fracture is identified and the  calcaneocuboid joint appears congruent.   Nonetheless, this trauma pattern places the patient at high risk of  posttraumatic osteoarthritis. Recommend orthopedic consultation.     [facility] with [facility] in [place] and they would like orthopedic surgeon on-call notified.  This is Dr. Claudio.  Did speak with him and he would like the ER doctor to attempt to reduce the foot/ankle.    Plan: Sunday moved to ER room 10 and Dr. Bullock to resume care    New Prescriptions    No medications on file       Final diagnoses:   Closed right tarsal navicular fracture       6/27/2022   [facility]     Judy Mcnair, CNP  06/27/22 6797

## 2022-06-27 NOTE — ED TRIAGE NOTES
Patient presents to urgent care for a work comp injury. Patient was unloading scrap on back of truck and jumped down and landed on something and hurt right ankle and sudden pain.

## 2022-06-27 NOTE — ED PROVIDER NOTES
History     Chief Complaint   Patient presents with     Ankle Pain     HPI  Sunday Ramírez is a 47 year old male with ankle/foot fracture/dislocation. See urgent care HPI for history today. Transferred to ER for further management. No back pain. No head/neck injury. See ED Course.    Allergies:  No Known Allergies    Problem List:    Patient Active Problem List    Diagnosis Date Noted     Colon cancer (H) 2019     Priority: Medium     Anxiety state 2018     Priority: Medium     Right carpal tunnel syndrome 2015     Priority: Medium     Trigger finger, left 2015     Priority: Medium        Past Medical History:    Past Medical History:   Diagnosis Date     Colonic mass 04/10/2019     Injury of left forearm      Uncomplicated asthma        Past Surgical History:    Past Surgical History:   Procedure Laterality Date     COLECTOMY LEFT N/A 2019    Procedure: COLECTOMY, LEFT;  Surgeon: Alexsander Tinoco MD;  Location: [facility] OR     COLONOSCOPY  04/10/2019    sigmoid mass     COLONOSCOPY N/A 4/10/2019    colon cancer, follow up in 1 year, 4/10/20       Family History:    Family History   Problem Relation Age of Onset     Other - See Comments Mother         Emphyzema       Social History:  Marital Status:   [2]  Social History     Tobacco Use     Smoking status: Former Smoker     Packs/day: 1.00     Types: Cigarettes     Quit date: 2015     Years since quittin.4     Smokeless tobacco: Current User     Types: Chew   Substance Use Topics     Alcohol use: No     Drug use: No     Types: Other     Comment: Drug use: No        Medications:    acetaminophen (TYLENOL) 500 MG tablet  aspirin-acetaminophen-caffeine (EXCEDRIN MIGRAINE) 250-250-65 MG tablet  ibuprofen (ADVIL/MOTRIN) 200 MG tablet  oxyCODONE (ROXICODONE) 5 MG tablet          Review of Systems   All other systems reviewed and are negative.      Physical Exam   BP: (!) 151/108  Pulse: 97  Temp: (!) 96.7  F (35.9  C)  Resp:  16  SpO2: 97 %      Physical Exam  Vitals and nursing note reviewed.   Constitutional:       General: He is in acute distress.      Appearance: Normal appearance. He is normal weight. He is not ill-appearing or toxic-appearing.   HENT:      Head: Normocephalic.      Right Ear: External ear normal.      Left Ear: External ear normal.      Nose: Nose normal.      Mouth/Throat:      Mouth: Mucous membranes are moist.      Pharynx: Oropharynx is clear.   Eyes:      Pupils: Pupils are equal, round, and reactive to light.   Cardiovascular:      Rate and Rhythm: Normal rate and regular rhythm.      Pulses: Normal pulses.   Pulmonary:      Effort: Pulmonary effort is normal.      Breath sounds: Normal breath sounds.   Abdominal:      General: Abdomen is flat.      Palpations: Abdomen is soft.      Tenderness: There is no abdominal tenderness.   Musculoskeletal:         General: No tenderness.      Cervical back: Normal range of motion.      Comments: Right foot with obvious deformity and reduced ROM.   Skin:     General: Skin is warm and dry.      Capillary Refill: Capillary refill takes less than 2 seconds.   Neurological:      Mental Status: He is alert and oriented to person, place, and time.      Comments: Normal sensation in RLE. Decreased strength in foot seems related to pain.   Psychiatric:         Mood and Affect: Mood normal.         ED Course        ED Course as of 06/27/22 1506   Mon Jun 27, 2022   1249 Paged Ortho. No answer. Left message and will try again in 10-15 minutes.   1324 Discussed with ortho. Goal is imperfect reduction, and bulky spain dressing, and then ortho follow-up with probable surgery in 48 hours.     [facility]    -Dislocation - Lower Extremity    Date/Time: 6/27/2022 2:42 PM  Performed by: Anil Bullock MD  Authorized by: Anil Bullock MD     Risks, benefits and alternatives discussed.    ED EVALUATION:      I have performed an Emergency Department Evaluation including  taking a history and physical examination, this evaluation will be documented in the electronic medical record for this ED encounter.      ASA Class: Class 2- mild systemic disease, no acute problems, no functional limitations    Mallampati: Grade 2- soft palate, base of uvula, tonsillar pillars, and portion of posterior pharyngeal wall visible    UNIVERSAL PROTOCOL   Site Marked: NA  Prior Images Obtained and Reviewed:  Yes  Required items: Required blood products, implants, devices and special equipment available    Patient identity confirmed:  Verbally with patient and arm band  Patient was reevaluated immediately before administering moderate or deep sedation or anesthesia  Confirmation Checklist:  Patient's identity using two indicators  Time out: Immediately prior to the procedure a time out was called    Universal Protocol: the Joint Commission Universal Protocol was followed      LOCATION     Location:  Ankle    Ankle dislocation type comment:  Tibiotalar dislocation, midfoot partial fracture dislocation    PRE PROCEDURE DETAILS:     Distal perfusion: normal      Range of motion: reduced      SEDATION  Patient Sedated: Yes    Sedation Type:  Moderate (conscious) sedation  Sedation:  Propofol  Vital signs: Vital signs monitored during sedation      PROCEDURE DETAILS      Manipulation performed: yes      Ankle reduction method:  Direct traction    Reduction successful: yes      Reduction confirmed with imaging: yes      Immobilization:  Splint    Splint type:  Short leg (with ankle stirrup)    Supplies used:  Ortho-Glass and cotton padding    POST PROCEDURE DETAILS      Neurological function: normal      Distal perfusion: normal      Range of motion: improved        PROCEDURE    Patient Tolerance:  Patient tolerated the procedure well with no immediate complications  Length of time physician/provider present for 1:1 monitoring during sedation: 20         Results for orders placed or performed during the  hospital encounter of 06/27/22 (from the past 24 hour(s))   XR Ankle Right G/E 3 Views    Narrative    PROCEDURE:  XR ANKLE RIGHT G/E 3 VIEWS    HISTORY: Trauma.    COMPARISON:  None.    TECHNIQUE:  3 views right ankle.    FINDINGS:  The ankle is distorted. There is widening of the medial  tibiotalar joint. There is abnormal relationship of the tibia and  calcaneus. A fracture fragment is present over the medial ankle  spanning approximately 2.7 cm that appears to arise from the medial  talus rather than the more common in the medial malleolus. The  appearance is concerning for a subtalar fracture dislocation. Consider  CT for further characterization.     CARLYLE SOLANO MD         SYSTEM ID:  [id number]   CT Ankle Right w/o Contrast    Narrative    CT ANKLE RIGHT W/O CONTRAST    HISTORY: subtalar fracture dislocation. .    COMPARISON: Radiographs 6/27/2022.    TECHNIQUE: Helical noncontrast CT images of the right ankle.    FINDINGS:    There is a fracture dislocation of the talus relative to the calcaneus  as well as the navicular.     There is subtalar dislocation with subluxation of the talus anteriorly  and laterally relative to the calcaneus. The medial talus demonstrates  numerous fractures, many with comminution and displacement. One of the  fracture lines propagates into the weightbearing talar dome on series  6 image 61 and series 8 image 41. A dominant 2.1 x 1.7 cm fragment is  displaced into the posterior medial aspect of the mortise joint. There  is associated rotation of the talus relative to the tibia with  widening of the medial tibiotalar joint. No medial or lateral  malleolar fracture is seen.    Posteromedial dislocation of the navicular bone relative to the  anterior talus is associated with subtle fracturing of the anterior  medial margin of the talus best seen on series 6 image 51 and series 8  image 21. Multiple more obvious navicular fractures are also present.     The entire mid and  forefoot travels with the navicular bone and is  angled medially and subluxed medially and posteriorly relative to the  talus.    No calcaneal fracture is identified. The calcaneocuboid joint remains  congruent.      Impression    IMPRESSION:     Complex fracture dislocations of the talocalcaneal and talonavicular  joints. Numerous fractures involving the talus and navicular bones.     Notably, no malleolar or calcaneal fracture is identified and the  calcaneocuboid joint appears congruent.     Nonetheless, this trauma pattern places the patient at high risk of  posttraumatic osteoarthritis. Recommend orthopedic consultation.     CARLYLE SOLANO MD         SYSTEM ID:  [id number]   Asymptomatic COVID-19 Virus (Coronavirus) by PCR Nasopharyngeal    Specimen: Nasopharyngeal; Swab   Result Value Ref Range    SARS CoV2 PCR Negative Negative    Narrative    Testing was performed using the Xpert Xpress SARS-CoV-2 Assay on the   "[employer] Systems. Additional information about   this Emergency Use Authorization (EUA) assay can be found via the Lab   Guide. This test should be ordered for the detection of SARS-CoV-2 in   individuals who meet SARS-CoV-2 clinical and/or epidemiological   criteria. Test performance is unknown in asymptomatic patients. This   test is for in vitro diagnostic use under the FDA EUA for   laboratories certified under CLIA to perform high complexity testing.   This test has not been FDA cleared or approved. A negative result   does not rule out the presence of PCR inhibitors in the specimen or   target RNA in concentration below the limit of detection for the   assay. The possibility of a false negative should be considered if   the patient's recent exposure or clinical presentation suggests   COVID-19. This test was validated by [facility]. This laboratory is certified under the Clinical Laboratory Improve  ment Amendments (CLIA) as qualified to  perform high complexity testing.   XR Ankle Right 2 Views    Narrative    Exam: XR ANKLE RT 2 VW    Technique: Right ankle, 2 views    Comparison: 6/27/2022    Exam reason: post reduction films    Findings:  Interval reduction and splinting of the complex fracture dislocations  of the talonavicular and talocalcaneal joints. There is improved  alignment compared to the prereduction plain films.    There is soft tissue swelling about the ankle.       Impression    Impression:  Interval reduction and splinting of the complex fracture dislocations  of the talonavicular and talocalcaneal joints with improved alignment.    DAVID CRUZ MD         SYSTEM ID:  [id number]       Medications   HYDROmorphone (PF) (DILAUDID) injection 0.5 mg (0.5 mg Intravenous Given 6/27/22 1324)   propofol (DIPRIVAN) injection 10 mg/mL vial (30 mg Intravenous Given 6/27/22 1353)   tiZANidine (ZANAFLEX) tablet 2 mg (2 mg Oral Given 6/27/22 1040)   ketorolac (TORADOL) injection 30 mg (30 mg Intramuscular Given 6/27/22 1057)   HYDROcodone-acetaminophen (NORCO) 5-325 MG per tablet 1 tablet (1 tablet Oral Given 6/27/22 1203)   acetaminophen (TYLENOL) tablet 325 mg (325 mg Oral Given 6/27/22 1203)   propofol (DIPRIVAN) injection 10 mg/mL vial (50 mg Intravenous Given 6/27/22 1347)       Assessments & Plan (with Medical Decision Making)     I have reviewed the nursing notes.    Fracture, discussed risks/benefits of reduction. Performed. See ED Course.    I have reviewed the findings, diagnosis, plan and need for follow up with the patient.    New Prescriptions    OXYCODONE (ROXICODONE) 5 MG TABLET    Take 1 tablet (5 mg) by mouth every 6 hours as needed for pain       Final diagnoses:   Closed right tarsal navicular fracture   Closed dislocation of right talus, initial encounter       6/27/2022   [facility]     Anil Bullock MD  06/27/22 [id number]     negative...

## 2022-06-27 NOTE — ED TRIAGE NOTES
Was unloading scrap on back of truck  Jumped down and landed on something   Right ankle pain

## 2022-06-27 NOTE — SEDATION DOCUMENTATION
Time out called at 1345.    Susana Hutson, Dr. Bullock, Dr. Garcia, Reyna Foster, and Judy Mcnair at bedside.    Propofol administered for conscious sedation per MD order, See MAR.     Reduction preformed by Dr. Bullock and Dr. Garcia, patient tolerating procedure well.     Patient regaining alertness @ 1400.    Ankle wrapped with cast padding, 4 inch orthoglass, and Ace Wrap.     Provider left room @ 1405    CMS intact post casting per Dr. Bullock

## 2022-07-13 ENCOUNTER — TELEPHONE (OUTPATIENT)
Dept: ORTHOPEDICS | Facility: OTHER | Age: 47
End: 2022-07-13

## 2022-07-13 NOTE — TELEPHONE ENCOUNTER
Left message with spouse to call back to schedule    DOS 7-18-22 w/ Dr. Greene @ [facility] ( draw date 4-5 days prior)

## 2022-07-14 ENCOUNTER — ALLIED HEALTH/NURSE VISIT (OUTPATIENT)
Dept: FAMILY MEDICINE | Facility: OTHER | Age: 47
End: 2022-07-14
Attending: PODIATRIST
Payer: COMMERCIAL

## 2022-07-14 DIAGNOSIS — Z01.812 ENCOUNTER FOR PREOPERATIVE SCREENING LABORATORY TESTING FOR COVID-19 VIRUS: Primary | ICD-10-CM

## 2022-07-14 DIAGNOSIS — Z11.52 ENCOUNTER FOR PREOPERATIVE SCREENING LABORATORY TESTING FOR COVID-19 VIRUS: Primary | ICD-10-CM

## 2022-07-14 PROCEDURE — U0005 INFEC AGEN DETEC AMPLI PROBE: HCPCS | Mod: ZL

## 2022-07-15 LAB — SARS-COV-2 RNA RESP QL NAA+PROBE: NEGATIVE

## 2022-08-20 ENCOUNTER — HEALTH MAINTENANCE LETTER (OUTPATIENT)
Age: 47
End: 2022-08-20

## 2022-08-31 ENCOUNTER — DOCUMENTATION ONLY (OUTPATIENT)
Dept: EMERGENCY MEDICINE | Facility: HOSPITAL | Age: 47
End: 2022-08-31

## 2022-08-31 DIAGNOSIS — S92.254A CLOSED NONDISPLACED FRACTURE OF NAVICULAR BONE OF RIGHT FOOT, INITIAL ENCOUNTER: ICD-10-CM

## 2022-08-31 DIAGNOSIS — S93.04XA CLOSED DISLOCATION OF RIGHT TALUS, INITIAL ENCOUNTER: Primary | ICD-10-CM

## 2022-11-20 ENCOUNTER — HEALTH MAINTENANCE LETTER (OUTPATIENT)
Age: 47
End: 2022-11-20

## 2023-05-11 ENCOUNTER — HOSPITAL ENCOUNTER (EMERGENCY)
Facility: HOSPITAL | Age: 48
Discharge: HOME | End: 2023-05-11
Payer: COMMERCIAL

## 2023-05-11 VITALS — RESPIRATION RATE: 20 BRPM | TEMPERATURE: 98 F | OXYGEN SATURATION: 100 % | HEART RATE: 81 BPM

## 2023-05-11 DIAGNOSIS — K08.89 PAIN, DENTAL: ICD-10-CM

## 2023-05-11 PROCEDURE — 250N000011 HC RX IP 250 OP 636

## 2023-05-11 PROCEDURE — 96372 THER/PROPH/DIAG INJ SC/IM: CPT

## 2023-05-11 PROCEDURE — 99213 OFFICE O/P EST LOW 20 MIN: CPT

## 2023-05-11 PROCEDURE — G0463 HOSPITAL OUTPT CLINIC VISIT: HCPCS | Mod: 25

## 2023-05-11 PROCEDURE — 250N000013 HC RX MED GY IP 250 OP 250 PS 637

## 2023-05-11 RX ORDER — IBUPROFEN 800 MG/1
800 TABLET, FILM COATED ORAL EVERY 8 HOURS PRN
Qty: 60 TABLET | Refills: 0 | Status: SHIPPED | OUTPATIENT
Start: 2023-05-11 | End: 2023-05-19

## 2023-05-11 RX ORDER — KETOROLAC TROMETHAMINE 30 MG/ML
30 INJECTION, SOLUTION INTRAMUSCULAR; INTRAVENOUS ONCE
Status: COMPLETED | OUTPATIENT
Start: 2023-05-11 | End: 2023-05-11

## 2023-05-11 RX ORDER — CHLORHEXIDINE GLUCONATE ORAL RINSE 1.2 MG/ML
15 SOLUTION DENTAL 2 TIMES DAILY
Qty: 473 ML | Refills: 0 | Status: SHIPPED | OUTPATIENT
Start: 2023-05-11 | End: 2023-05-18

## 2023-05-11 RX ORDER — HYDROCODONE BITARTRATE AND ACETAMINOPHEN 5; 325 MG/1; MG/1
1 TABLET ORAL ONCE
Status: COMPLETED | OUTPATIENT
Start: 2023-05-11 | End: 2023-05-11

## 2023-05-11 RX ADMIN — KETOROLAC TROMETHAMINE 30 MG: 30 INJECTION, SOLUTION INTRAMUSCULAR; INTRAVENOUS at 15:33

## 2023-05-11 RX ADMIN — HYDROCODONE BITARTRATE AND ACETAMINOPHEN 1 TABLET: 5; 325 TABLET ORAL at 15:33

## 2023-05-11 ASSESSMENT — ENCOUNTER SYMPTOMS
SORE THROAT: 0
CONSTIPATION: 0
APPETITE CHANGE: 0
VOMITING: 0
RHINORRHEA: 0
COUGH: 0
CHILLS: 0
SHORTNESS OF BREATH: 0
DYSURIA: 0
ACTIVITY CHANGE: 0
ABDOMINAL PAIN: 0
FEVER: 0
DIARRHEA: 0

## 2023-05-11 NOTE — ED TRIAGE NOTES
Dental pain top front dentist appoint 23rd but pain too intense.     Triage Assessment     Row Name 05/11/23 1500       Triage Assessment (Adult)    Airway WDL WDL       Respiratory WDL    Respiratory WDL WDL       Skin Circulation/Temperature WDL    Skin Circulation/Temperature WDL WDL       Cardiac WDL    Cardiac WDL WDL       Peripheral/Neurovascular WDL    Peripheral Neurovascular WDL WDL       Cognitive/Neuro/Behavioral WDL    Cognitive/Neuro/Behavioral WDL WDL

## 2023-05-11 NOTE — DISCHARGE INSTRUCTIONS
You can take 1000mg of tylenol every 6 hours (max 4000mg in 24 hours) and 800mg of ibuprofen every 8 hours.     Antibiotic and swish and spit mouth wash sent to pharmacy. Yogurt or probiotic while taking the antibiotics.     Please return with any increased pain, uncontrolled fevers, or concerns.

## 2023-05-11 NOTE — ED TRIAGE NOTES
Pt presents with c/o having upper dental pain with swelling   Pt does have a appt on the 23rd but couldn't wait and could not be seen any earlier   S/x started 3 days ago  Pt has been taking Excedrin which has some relief    Triage Assessment     Row Name 05/11/23 1500       Triage Assessment (Adult)    Airway WDL WDL       Respiratory WDL    Respiratory WDL WDL       Skin Circulation/Temperature WDL    Skin Circulation/Temperature WDL WDL       Cardiac WDL    Cardiac WDL WDL       Peripheral/Neurovascular WDL    Peripheral Neurovascular WDL WDL       Cognitive/Neuro/Behavioral WDL    Cognitive/Neuro/Behavioral WDL WDL

## 2023-05-11 NOTE — ED PROVIDER NOTES
History     Chief Complaint   Patient presents with     Dental Pain     HPI  Sunday Ramírez is a 47 year old male who presents to the urgent care with complaints of increased frontal dental pain. He has a history of issues with teeth after a root canal. Has an appt to see dentist on . He has been taking Excedrin at home with minimal relief in pain. Denies trismus, n/v/d, fevers, and abd pain. No recent abx. Non smoker.     Allergies:  No Known Allergies    Problem List:    Patient Active Problem List    Diagnosis Date Noted     Colon cancer (H) 2019     Priority: Medium     Anxiety state 2018     Priority: Medium     Right carpal tunnel syndrome 2015     Priority: Medium     Trigger finger, left 2015     Priority: Medium        Past Medical History:    Past Medical History:   Diagnosis Date     Colonic mass 04/10/2019     Injury of left forearm      Uncomplicated asthma        Past Surgical History:    Past Surgical History:   Procedure Laterality Date     COLECTOMY LEFT N/A 2019    Procedure: COLECTOMY, LEFT;  Surgeon: Alexsander Tinoco MD;  Location: [facility] OR     COLONOSCOPY  04/10/2019    sigmoid mass     COLONOSCOPY N/A 4/10/2019    colon cancer, follow up in 1 year, 4/10/20       Family History:    Family History   Problem Relation Age of Onset     Other - See Comments Mother         Emphyzema       Social History:  Marital Status:   [2]  Social History     Tobacco Use     Smoking status: Former     Packs/day: 1.00     Types: Cigarettes     Quit date: 2015     Years since quittin.3     Smokeless tobacco: Current     Types: Chew   Substance Use Topics     Alcohol use: No     Drug use: No     Types: Other     Comment: Drug use: No        Medications:    acetaminophen (TYLENOL) 500 MG tablet  amoxicillin-clavulanate (AUGMENTIN) 875-125 MG tablet  aspirin-acetaminophen-caffeine (EXCEDRIN MIGRAINE) 250-250-65 MG tablet  chlorhexidine (PERIDEX) 0.12 %  solution  ibuprofen (ADVIL/MOTRIN) 200 MG tablet  ibuprofen (ADVIL/MOTRIN) 800 MG tablet          Review of Systems   Constitutional: Negative for activity change, appetite change, chills and fever.   HENT: Positive for dental problem. Negative for congestion, ear pain, rhinorrhea and sore throat.    Respiratory: Negative for cough and shortness of breath.    Gastrointestinal: Negative for abdominal pain, constipation, diarrhea and vomiting.   Genitourinary: Negative for dysuria.   All other systems reviewed and are negative.      Physical Exam   Pulse: 81  Temp: 98  F (36.7  C)  Resp: 20  SpO2: 100 %      Physical Exam  Vitals and nursing note reviewed.   Constitutional:       General: He is in acute distress.      Appearance: He is not ill-appearing.   HENT:      Nose: No congestion or rhinorrhea.      Mouth/Throat:      Lips: No lesions.      Mouth: Mucous membranes are moist.      Dentition: Abnormal dentition. Dental tenderness and dental caries present.      Pharynx: Oropharynx is clear. No oropharyngeal exudate or posterior oropharyngeal erythema.     Cardiovascular:      Rate and Rhythm: Normal rate and regular rhythm.      Pulses: Normal pulses.      Heart sounds: Normal heart sounds. No murmur heard.  Pulmonary:      Effort: Pulmonary effort is normal. No respiratory distress.      Breath sounds: Normal breath sounds. No stridor. No wheezing or rhonchi.   Musculoskeletal:      Cervical back: Normal range of motion. No rigidity.   Lymphadenopathy:      Cervical: No cervical adenopathy.   Skin:     General: Skin is warm and dry.   Neurological:      Mental Status: He is alert.         ED Course                 Procedures                  No results found for this or any previous visit (from the past 24 hour(s)).    Medications   ketorolac (TORADOL) injection 30 mg (has no administration in time range)   HYDROcodone-acetaminophen (NORCO) 5-325 MG per tablet 1 tablet (has no administration in time range)        Assessments & Plan (with Medical Decision Making)     I have reviewed the nursing notes.    I have reviewed the findings, diagnosis, plan and need for follow up with the patient.  Sunday Ramírez is a 47 year old male who presents to the urgent care with complaints of increased frontal dental pain. He has a history of issues with teeth after a root canal. Has an appt to see dentist on 5/23. He has been taking Excedrin at home with minimal relief in pain. Denies trismus, n/v/d, fevers, and abd pain. No recent abx. Non smoker.     MDM: VSS and afebrile. Lungs clear, heart tones regular. No jaw tenderness or trismus. Multiple broken teeth to front upper. No visible abscess. toradol injection and one [place] given in UC.     (K08.89) Pain, dental  Plan: augmentin, ibuprofen, and peridex swish and spit prescribed. Tylenol and ibuprofen as needed for pain. Yogurt or probiotic while taki abx. Follow up with dentist. Return with any uncontrolled pain, fevers, increased swelling, or concerns. Understanding verbalized.           New Prescriptions    AMOXICILLIN-CLAVULANATE (AUGMENTIN) 875-125 MG TABLET    Take 1 tablet by mouth 2 times daily for 7 days    CHLORHEXIDINE (PERIDEX) 0.12 % SOLUTION    Swish and spit 15 mLs in mouth 2 times daily for 7 days    IBUPROFEN (ADVIL/MOTRIN) 800 MG TABLET    Take 1 tablet (800 mg) by mouth every 8 hours as needed for moderate pain       Final diagnoses:   Pain, dental       5/11/2023   [facility]     Joellen He NP  05/11/23 2390

## 2023-05-12 ENCOUNTER — HOSPITAL ENCOUNTER (EMERGENCY)
Facility: HOSPITAL | Age: 48
Discharge: HOME | End: 2023-05-12
Attending: INTERNAL MEDICINE | Admitting: INTERNAL MEDICINE
Payer: COMMERCIAL

## 2023-05-12 VITALS
TEMPERATURE: 100.4 F | DIASTOLIC BLOOD PRESSURE: 82 MMHG | HEART RATE: 69 BPM | OXYGEN SATURATION: 97 % | HEIGHT: 70 IN | WEIGHT: 260 LBS | SYSTOLIC BLOOD PRESSURE: 114 MMHG | BODY MASS INDEX: 37.22 KG/M2 | RESPIRATION RATE: 16 BRPM

## 2023-05-12 DIAGNOSIS — K04.7 DENTAL ABSCESS: ICD-10-CM

## 2023-05-12 PROCEDURE — 250N000011 HC RX IP 250 OP 636: Performed by: INTERNAL MEDICINE

## 2023-05-12 PROCEDURE — 96372 THER/PROPH/DIAG INJ SC/IM: CPT | Performed by: INTERNAL MEDICINE

## 2023-05-12 PROCEDURE — 41800 DRAINAGE OF GUM LESION: CPT | Performed by: INTERNAL MEDICINE

## 2023-05-12 PROCEDURE — 41800 DRAINAGE OF GUM LESION: CPT

## 2023-05-12 PROCEDURE — 99285 EMERGENCY DEPT VISIT HI MDM: CPT

## 2023-05-12 PROCEDURE — 99285 EMERGENCY DEPT VISIT HI MDM: CPT | Mod: 25

## 2023-05-12 RX ORDER — OXYCODONE HYDROCHLORIDE 5 MG/1
5 TABLET ORAL EVERY 6 HOURS PRN
Qty: 6 TABLET | Refills: 0 | Status: SHIPPED | OUTPATIENT
Start: 2023-05-12

## 2023-05-12 RX ORDER — NAPROXEN 500 MG/1
500 TABLET ORAL ONCE
Status: DISCONTINUED | OUTPATIENT
Start: 2023-05-12 | End: 2023-05-13 | Stop reason: HOSPADM

## 2023-05-12 RX ADMIN — HYDROMORPHONE HYDROCHLORIDE 1 MG: 1 INJECTION, SOLUTION INTRAMUSCULAR; INTRAVENOUS; SUBCUTANEOUS at 23:21

## 2023-05-12 ASSESSMENT — ACTIVITIES OF DAILY LIVING (ADL): ADLS_ACUITY_SCORE: 33

## 2023-05-13 ENCOUNTER — HOSPITAL ENCOUNTER (EMERGENCY)
Facility: HOSPITAL | Age: 48
Discharge: HOME | End: 2023-05-13
Attending: PHYSICIAN ASSISTANT | Admitting: PHYSICIAN ASSISTANT
Payer: COMMERCIAL

## 2023-05-13 VITALS
HEIGHT: 70 IN | BODY MASS INDEX: 35.98 KG/M2 | HEART RATE: 75 BPM | RESPIRATION RATE: 16 BRPM | TEMPERATURE: 98.8 F | WEIGHT: 251.32 LBS | SYSTOLIC BLOOD PRESSURE: 158 MMHG | DIASTOLIC BLOOD PRESSURE: 88 MMHG | OXYGEN SATURATION: 99 %

## 2023-05-13 DIAGNOSIS — K04.7 APICAL ALVEOLAR ABSCESS: ICD-10-CM

## 2023-05-13 PROCEDURE — 999N000104 HC STATISTIC NO CHARGE

## 2023-05-13 PROCEDURE — 41800 DRAINAGE OF GUM LESION: CPT

## 2023-05-13 PROCEDURE — 250N000011 HC RX IP 250 OP 636: Performed by: PHYSICIAN ASSISTANT

## 2023-05-13 PROCEDURE — 96372 THER/PROPH/DIAG INJ SC/IM: CPT | Mod: XU | Performed by: PHYSICIAN ASSISTANT

## 2023-05-13 PROCEDURE — 99024 POSTOP FOLLOW-UP VISIT: CPT | Performed by: PHYSICIAN ASSISTANT

## 2023-05-13 RX ORDER — CEFTRIAXONE SODIUM 1 G
1 VIAL (EA) INJECTION ONCE
Status: COMPLETED | OUTPATIENT
Start: 2023-05-13 | End: 2023-05-13

## 2023-05-13 RX ADMIN — CEFTRIAXONE 1 G: 1 INJECTION, POWDER, FOR SOLUTION INTRAMUSCULAR; INTRAVENOUS at 16:22

## 2023-05-13 ASSESSMENT — ENCOUNTER SYMPTOMS
HEMATURIA: 0
CHILLS: 0
FATIGUE: 0
DIARRHEA: 0
DIZZINESS: 0
VOMITING: 0
ARTHRALGIAS: 0
HEADACHES: 0
FEVER: 0
COUGH: 0
SHORTNESS OF BREATH: 0
MYALGIAS: 0
EYE REDNESS: 0
NAUSEA: 0
ABDOMINAL PAIN: 0
RHINORRHEA: 0
SORE THROAT: 0
NECK STIFFNESS: 0
DYSURIA: 0

## 2023-05-13 NOTE — ED NOTES
Patient presents with complaints of dental pain. He states that he was seen last night for the same thing, but it's 10x worse tonight and states he can't get in until the 23th of this month.

## 2023-05-13 NOTE — ED PROVIDER NOTES
History     Chief Complaint   Patient presents with     Dental Problem     The history is provided by the patient.   Dental Pain  Location:  Upper  Upper teeth location:   central incisor  Quality:  Aching  Onset quality:  Gradual  Timing:  Constant  Progression:  Worsening  Associated symptoms: no congestion, no fever and no headaches        Allergies:  No Known Allergies    Problem List:    Patient Active Problem List    Diagnosis Date Noted     Colon cancer (H) 2019     Priority: Medium     Anxiety state 2018     Priority: Medium     Right carpal tunnel syndrome 2015     Priority: Medium     Trigger finger, left 2015     Priority: Medium        Past Medical History:    Past Medical History:   Diagnosis Date     Colonic mass 04/10/2019     Injury of left forearm      Uncomplicated asthma        Past Surgical History:    Past Surgical History:   Procedure Laterality Date     COLECTOMY LEFT N/A 2019    Procedure: COLECTOMY, LEFT;  Surgeon: Alexsander Tinoco MD;  Location: [facility] OR     COLONOSCOPY  04/10/2019    sigmoid mass     COLONOSCOPY N/A 4/10/2019    colon cancer, follow up in 1 year, 4/10/20       Family History:    Family History   Problem Relation Age of Onset     Other - See Comments Mother         Emphyzema       Social History:  Marital Status:   [2]  Social History     Tobacco Use     Smoking status: Former     Packs/day: 1.00     Types: Cigarettes     Quit date: 2015     Years since quittin.3     Smokeless tobacco: Current     Types: Chew   Substance Use Topics     Alcohol use: No     Drug use: No     Types: Other     Comment: Drug use: No        Medications:    acetaminophen (TYLENOL) 500 MG tablet  amoxicillin-clavulanate (AUGMENTIN) 875-125 MG tablet  aspirin-acetaminophen-caffeine (EXCEDRIN MIGRAINE) 250-250-65 MG tablet  chlorhexidine (PERIDEX) 0.12 % solution  ibuprofen (ADVIL/MOTRIN) 800 MG tablet  oxyCODONE (ROXICODONE) 5 MG tablet  ibuprofen  "(ADVIL/MOTRIN) 200 MG tablet          Review of Systems   Constitutional: Negative for chills, fatigue and fever.   HENT: Negative for congestion, ear pain, rhinorrhea and sore throat.    Eyes: Negative for redness.   Respiratory: Negative for cough and shortness of breath.    Cardiovascular: Negative for chest pain.   Gastrointestinal: Negative for abdominal pain, diarrhea, nausea and vomiting.   Genitourinary: Negative for dysuria and hematuria.   Musculoskeletal: Negative for arthralgias, myalgias and neck stiffness.   Skin: Negative for rash.   Neurological: Negative for dizziness and headaches.       Physical Exam   BP: (!) 159/102  Pulse: 97  Temp: 100.4  F (38  C)  Resp: 16  Height: 177.8 cm (5' 10\")  Weight: 117.9 kg (260 lb)  SpO2: 96 %      Physical Exam  Constitutional:       General: He is not in acute distress.     Appearance: Normal appearance. He is not toxic-appearing.   HENT:      Head: Atraumatic.   Eyes:      General: No scleral icterus.     Conjunctiva/sclera: Conjunctivae normal.   Cardiovascular:      Rate and Rhythm: Normal rate.      Heart sounds: Normal heart sounds.   Pulmonary:      Effort: Pulmonary effort is normal. No respiratory distress.      Breath sounds: Normal breath sounds.   Abdominal:      Palpations: Abdomen is soft.      Tenderness: There is no abdominal tenderness.   Musculoskeletal:         General: No deformity.      Cervical back: Neck supple.   Skin:     General: Skin is warm.   Neurological:      Mental Status: He is alert.         ED Course                 [facility]    PROCEDURE: -Incision/Drainage    Date/Time: 5/13/2023 5:55 AM    Performed by: Gualberto Boles MD  Authorized by: Gualberto Boles MD    Risks, benefits and alternatives discussed.      LOCATION:      Type:  Abscess    Location:  Mouth    Mouth location:  Alveolar process    PROCEDURE TYPE:     Complexity:  Simple    ANESTHESIA (see MAR for exact dosages):     Anesthesia method:  None    PROCEDURE " DETAILS:     Incision types:  Stab incision    Scalpel blade:  10    Drainage:  Purulent    Drainage amount:  Scant    Wound treatment:  Wound left open    Packing materials:  None                    No results found for this or any previous visit (from the past 24 hour(s)).    Medications   HYDROmorphone (DILAUDID) injection 1 mg (1 mg Intramuscular $Given 5/12/23 3370)       Assessments & Plan (with Medical Decision Making)   Dental abcess , already on amoxicillin  I &D of abscess was done  Oxycodone 6 tablet prescribed  discontinue home  I have reviewed the nursing notes.    I have reviewed the findings, diagnosis, plan and need for follow up with the patient.          Discharge Medication List as of 5/12/2023 11:40 PM      START taking these medications    Details   oxyCODONE (ROXICODONE) 5 MG tablet Take 1 tablet (5 mg) by mouth every 6 hours as needed for severe pain, Disp-6 tablet, R-0, [facility]             Final diagnoses:   Dental abscess       5/12/2023   [facility]     Gualberto Boles MD  05/13/23 0706

## 2023-05-13 NOTE — ED PROVIDER NOTES
History     Chief Complaint   Patient presents with     Dental Pain     HPI  Sunday Ramírez is a 47 year old male who presents back to the UC today for worsening dental abscess above his two upper front teeth. He was seen in the UC initially 3 days ago and prescribed Augmentin and Peridex mouth wash. He returned last night to the ER with an alveolar abscess which was drained with scant drainage. Today, the abscess has grown and with that the pain has worsened. Denies fevers, difficulty swallowing, or difficulty breathing. He has been taking the Augmentin and performing the mouth washes as prescribed.     Allergies:  No Known Allergies    Problem List:    Patient Active Problem List    Diagnosis Date Noted     Colon cancer (H) 2019     Priority: Medium     Anxiety state 2018     Priority: Medium     Right carpal tunnel syndrome 2015     Priority: Medium     Trigger finger, left 2015     Priority: Medium        Past Medical History:    Past Medical History:   Diagnosis Date     Colonic mass 04/10/2019     Injury of left forearm      Uncomplicated asthma        Past Surgical History:    Past Surgical History:   Procedure Laterality Date     COLECTOMY LEFT N/A 2019    Procedure: COLECTOMY, LEFT;  Surgeon: Alexsander Tinoco MD;  Location: [facility] OR     COLONOSCOPY  04/10/2019    sigmoid mass     COLONOSCOPY N/A 4/10/2019    colon cancer, follow up in 1 year, 4/10/20       Family History:    Family History   Problem Relation Age of Onset     Other - See Comments Mother         Emphyzema       Social History:  Marital Status:   [2]  Social History     Tobacco Use     Smoking status: Former     Packs/day: 1.00     Types: Cigarettes     Quit date: 2015     Years since quittin.3     Smokeless tobacco: Current     Types: Chew   Substance Use Topics     Alcohol use: No     Drug use: No     Types: Other     Comment: Drug use: No        Medications:    acetaminophen (TYLENOL) 500 MG  "tablet  amoxicillin-clavulanate (AUGMENTIN) 875-125 MG tablet  aspirin-acetaminophen-caffeine (EXCEDRIN MIGRAINE) 250-250-65 MG tablet  chlorhexidine (PERIDEX) 0.12 % solution  ibuprofen (ADVIL/MOTRIN) 200 MG tablet  ibuprofen (ADVIL/MOTRIN) 800 MG tablet  oxyCODONE (ROXICODONE) 5 MG tablet          Review of Systems   All other systems reviewed and are negative.      Physical Exam   BP: (!) 169/111  Pulse: 75  Temp: 98.8  F (37.1  C)  Resp: 16  Height: 177.8 cm (5' 10\")  Weight: 114 kg (251 lb 5.2 oz)  SpO2: 99 %      Physical Exam  Vitals and nursing note reviewed.   Constitutional:       General: He is in acute distress.      Appearance: He is well-developed. He is not diaphoretic.   HENT:      Head: Normocephalic and atraumatic.      Jaw: No trismus, tenderness, swelling, pain on movement or malocclusion.      Right Ear: External ear normal.      Nose: Nose normal.      Mouth/Throat:      Mouth: No angioedema.      Dentition: Abnormal dentition. Dental tenderness, gingival swelling, dental caries and dental abscesses present.      Pharynx: No oropharyngeal exudate.     Eyes:      General: No scleral icterus.        Right eye: No discharge.         Left eye: No discharge.      Conjunctiva/sclera: Conjunctivae normal.      Pupils: Pupils are equal, round, and reactive to light.   Neck:      Vascular: No JVD.   Cardiovascular:      Rate and Rhythm: Normal rate and regular rhythm.      Heart sounds: Normal heart sounds. No murmur heard.     No friction rub. No gallop.   Pulmonary:      Effort: Pulmonary effort is normal. No respiratory distress.      Breath sounds: Normal breath sounds. No wheezing or rales.   Chest:      Chest wall: No tenderness.   Abdominal:      Tenderness: There is no abdominal tenderness.   Musculoskeletal:         General: Normal range of motion.      Cervical back: Normal range of motion and neck supple.   Lymphadenopathy:      Cervical: No cervical adenopathy.   Skin:     General: Skin is " warm and dry.      Capillary Refill: Capillary refill takes less than 2 seconds.      Coloration: Skin is not pale.      Findings: No erythema or rash.   Neurological:      Mental Status: He is alert and oriented to person, place, and time.      Cranial Nerves: No cranial nerve deficit.      Coordination: Coordination normal.   Psychiatric:         Behavior: Behavior normal.         Thought Content: Thought content normal.         Judgment: Judgment normal.         ED Course                 [facility]    PROCEDURE: -Incision/Drainage    Date/Time: 5/13/2023 4:37 PM    Performed by: Sade Armas PA-C  Authorized by: Sade Armas PA-C    Risks, benefits and alternatives discussed.      LOCATION:      Type:  Abscess    Location:  Mouth    Mouth location:  Alveolar process    PROCEDURE TYPE:     Complexity:  Simple    ANESTHESIA (see MAR for exact dosages):     Anesthesia method:  Local infiltration    Local anesthetic:  Lidocaine 2% w/o epi    PROCEDURE DETAILS:     Incision types:  Stab incision (3 stab incisions)    Incision depth:  Subcutaneous    Drainage:  Purulent    Drainage amount:  Copious    Wound treatment:  Wound left open    Packing materials:  None    PROCEDURE    Patient Tolerance:  Patient tolerated the procedure well with no immediate complications             Medications   cefTRIAXone (ROCEPHIN) in lidocaine 1% (PF) for IM administration 1 g (1 g Intramuscular $Given 5/13/23 1536)       Assessments & Plan (with Medical Decision Making)   Pt presents back with worsening alveolar abscess, now 2xm x 1cm and fluctuant. Distressed due to pain. I&D was performed (3 stab incisions with copious purulent drainage following) and instant relief of pain. Pt was given a Rocephin 1g IM injection here and should continue his Augmentin and Peridex mouthwash as prescribed. I would expect his condition to rapidly improve now that we have drained the abscess. He should return here again with any  new/worsening symptoms.     Plan: Continue the Augmentin and mouth wash as prescribed.   Frequent warm water rinses to keep the abscess open and draining.   Follow up with the dentist as scheduled.   Please return here with any new or worsening symptoms.     I have reviewed the nursing notes.    I have reviewed the findings, diagnosis, plan and need for follow up with the patient.    New Prescriptions    No medications on file       Final diagnoses:   Apical alveolar abscess       5/13/2023   [facility]

## 2023-05-13 NOTE — ED TRIAGE NOTES
Pt presents with c/o front top dental pain. Reports pain is worsening. Pt was in the last two days. Pt was put on amoxicillin and 6 tabs of oxy. Pt does have a dental appointment on Monday. States the oxy have not been working.

## 2023-05-13 NOTE — DISCHARGE INSTRUCTIONS
Continue the Augmentin and mouth wash as prescribed.   Frequent warm water rinses to keep the abscess open and draining.   Follow up with the dentist as scheduled.   Please return here with any new or worsening symptoms.

## 2023-05-13 NOTE — ED TRIAGE NOTES
48 y/o male presents with worsening dental pain. Patient had an I&D last night. He is on amoxicillin; he was given oxycodone 6 tablets last night.

## 2023-05-13 NOTE — ED TRIAGE NOTES
Was in yesterday for dental pain and hurts worse  Was given a shot toradol and oxy.  Rx for ibuprofen and abx  Pain is worse.

## 2023-09-10 ENCOUNTER — HEALTH MAINTENANCE LETTER (OUTPATIENT)
Age: 48
End: 2023-09-10

## 2024-04-01 ENCOUNTER — TELEPHONE (OUTPATIENT)
Dept: FAMILY MEDICINE | Facility: OTHER | Age: 49
End: 2024-04-01

## 2024-04-01 ENCOUNTER — HOSPITAL ENCOUNTER (OUTPATIENT)
Facility: HOSPITAL | Age: 49
End: 2024-04-01
Attending: INTERNAL MEDICINE | Admitting: INTERNAL MEDICINE
Payer: COMMERCIAL

## 2024-04-01 DIAGNOSIS — Z12.11 ENCOUNTER FOR SCREENING COLONOSCOPY: Primary | ICD-10-CM

## 2024-04-01 RX ORDER — BISACODYL 5 MG/1
10 TABLET, DELAYED RELEASE ORAL ONCE
Qty: 2 TABLET | Refills: 0 | Status: SHIPPED | OUTPATIENT
Start: 2024-04-01 | End: 2024-04-01

## 2024-04-01 NOTE — TELEPHONE ENCOUNTER
Screening Colonoscopy scheduled on 4/24/2024 with Dr Mast. DOES need PreOp for anesthesia. Guide to Colonoscopy sent by [facility]. Bowel Prep Rx sent [facility].

## 2024-04-01 NOTE — TELEPHONE ENCOUNTER
Screening Questions for the Scheduling of Screening Colonoscopies     (If Colonoscopy is diagnostic, Provider should review the chart before scheduling.)    Are you younger than 50 or older than 80?  Yes, 48 year old    Do you take aspirin or fish oil?  No (if yes, tell patient to stop 1 week prior to Colonoscopy)    Do you take warfarin (Coumadin), clopidogrel (Plavix), apixaban (Eliquis), dabigatram (Pradaxa), rivaroxaban (Xarelto) or any blood thinner? No    Do you use oxygen at home?  No    Do you have kidney disease? No    Are you on dialysis? No    Have you had a stroke or heart attack in the last year? No    Have you had a stent in your heart or any blood vessel in the last year? No    Have you had a transplant of any organ?  No    Have you had a colonoscopy or upper endoscopy (EGD) before?  YES. Date-2019.         Date of scheduled Colonoscopy: 4/24/24    Provider: Dr. Moseley     [facility]

## 2024-04-17 RX ORDER — LIDOCAINE 40 MG/G
CREAM TOPICAL
Status: CANCELLED | OUTPATIENT
Start: 2024-04-17

## 2024-04-17 RX ORDER — ONDANSETRON 2 MG/ML
4 INJECTION INTRAMUSCULAR; INTRAVENOUS EVERY 30 MIN PRN
Status: CANCELLED | OUTPATIENT
Start: 2024-04-17

## 2024-04-17 RX ORDER — ONDANSETRON 4 MG/1
4 TABLET, ORALLY DISINTEGRATING ORAL EVERY 30 MIN PRN
Status: CANCELLED | OUTPATIENT
Start: 2024-04-17

## 2024-04-17 RX ORDER — NALOXONE HYDROCHLORIDE 0.4 MG/ML
0.1 INJECTION, SOLUTION INTRAMUSCULAR; INTRAVENOUS; SUBCUTANEOUS
Status: CANCELLED | OUTPATIENT
Start: 2024-04-17

## 2024-04-17 RX ORDER — SODIUM CHLORIDE, SODIUM LACTATE, POTASSIUM CHLORIDE, CALCIUM CHLORIDE 600; 310; 30; 20 MG/100ML; MG/100ML; MG/100ML; MG/100ML
INJECTION, SOLUTION INTRAVENOUS CONTINUOUS
Status: CANCELLED | OUTPATIENT
Start: 2024-04-17

## 2024-04-22 NOTE — OR NURSING
Called patient multiple times from 04/17 to today 04/22, no answer and no voicemail set up.  Patient scheduled for procedure 04/24 with Dr. Moseley.  OR  notified.

## 2024-09-11 ENCOUNTER — HOSPITAL ENCOUNTER (EMERGENCY)
Facility: HOSPITAL | Age: 49
Discharge: HOME | End: 2024-09-11
Attending: PHYSICIAN ASSISTANT | Admitting: PHYSICIAN ASSISTANT

## 2024-09-11 ENCOUNTER — APPOINTMENT (OUTPATIENT)
Dept: ULTRASOUND IMAGING | Facility: HOSPITAL | Age: 49
End: 2024-09-11
Attending: PHYSICIAN ASSISTANT

## 2024-09-11 VITALS
OXYGEN SATURATION: 97 % | RESPIRATION RATE: 15 BRPM | DIASTOLIC BLOOD PRESSURE: 90 MMHG | SYSTOLIC BLOOD PRESSURE: 146 MMHG | BODY MASS INDEX: 36.22 KG/M2 | TEMPERATURE: 97.8 F | HEIGHT: 70 IN | WEIGHT: 253 LBS | HEART RATE: 74 BPM

## 2024-09-11 DIAGNOSIS — N43.3 HYDROCELE, LEFT: ICD-10-CM

## 2024-09-11 DIAGNOSIS — N45.2 ORCHITIS OF RIGHT TESTICLE: ICD-10-CM

## 2024-09-11 PROCEDURE — 99213 OFFICE O/P EST LOW 20 MIN: CPT | Performed by: PHYSICIAN ASSISTANT

## 2024-09-11 PROCEDURE — 96372 THER/PROPH/DIAG INJ SC/IM: CPT | Performed by: PHYSICIAN ASSISTANT

## 2024-09-11 PROCEDURE — G0463 HOSPITAL OUTPT CLINIC VISIT: HCPCS | Mod: 25

## 2024-09-11 PROCEDURE — 93976 VASCULAR STUDY: CPT

## 2024-09-11 PROCEDURE — 250N000011 HC RX IP 250 OP 636: Performed by: PHYSICIAN ASSISTANT

## 2024-09-11 PROCEDURE — 76870 US EXAM SCROTUM: CPT

## 2024-09-11 RX ORDER — KETOROLAC TROMETHAMINE 30 MG/ML
30 INJECTION, SOLUTION INTRAMUSCULAR; INTRAVENOUS ONCE
Status: DISCONTINUED | OUTPATIENT
Start: 2024-09-11 | End: 2024-09-11

## 2024-09-11 RX ORDER — KETOROLAC TROMETHAMINE 30 MG/ML
30 INJECTION, SOLUTION INTRAMUSCULAR; INTRAVENOUS ONCE
Status: COMPLETED | OUTPATIENT
Start: 2024-09-11 | End: 2024-09-11

## 2024-09-11 RX ORDER — LEVOFLOXACIN 500 MG/1
500 TABLET, FILM COATED ORAL DAILY
Qty: 10 TABLET | Refills: 0 | Status: SHIPPED | OUTPATIENT
Start: 2024-09-11 | End: 2024-09-21

## 2024-09-11 RX ADMIN — KETOROLAC TROMETHAMINE 30 MG: 30 INJECTION, SOLUTION INTRAMUSCULAR at 14:11

## 2024-09-11 ASSESSMENT — ACTIVITIES OF DAILY LIVING (ADL): ADLS_ACUITY_SCORE: 35

## 2024-09-11 NOTE — DISCHARGE INSTRUCTIONS
Patient has a hydrocele of the left testicle which is consistent with some of the swelling most of this is just fluid recommend ice elevation and follow-up with urology for further evaluation and treatment patient does have a mild orchitis of the right testicle which may be affecting the left patient be treated with Levaquin 500 mg daily for 10 days recommend more of a loose short recommend icing no significant heavy lifting pushing or pulling recommend Tylenol   ibuprofen for the low back and ice.  Follow-up with urology if symptoms worsen or change.  Discharged in stable condition.  Workability - Patient cannot lift push or pull any more than 5 pounds until patient is seen by urology.

## 2024-09-11 NOTE — ED TRIAGE NOTES
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 Also reports left testicle is swollen and states the pain radiates to the left side hip. Pt took ibuprofen today. Reports no issues toileting.         "

## 2024-09-12 ASSESSMENT — ENCOUNTER SYMPTOMS
EYES NEGATIVE: 1
RESPIRATORY NEGATIVE: 1
GASTROINTESTINAL NEGATIVE: 1
ENDOCRINE NEGATIVE: 1
CONSTITUTIONAL NEGATIVE: 1
BACK PAIN: 1
CARDIOVASCULAR NEGATIVE: 1

## 2024-09-12 NOTE — ED PROVIDER NOTES
History     Chief Complaint   Patient presents with    Back Pain     HPI  Sunday Ramírez is a 49 year old male who presents to the urgent care with complaints of LEFT low back pain that has now  radiated into the left testicle groin area. 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  This all started on Monday his back felt worse as he had been working over the course of the week and this is now when the left testicle became painful and swollen no redness.  No difficulty urinating.  Has taken some ibuprofen only today.  Denies any numbness or tingling into the lower extremity or into the leg.  No prior history of back problems.  He delivers furniture for living.  He states that he did report this to his boss this is a work comp injury.  His date of injury then would be September 6, 2024.  Bowel or bladder dysfunction.  No numbness or tingling.  No saddle anesthesia.    Allergies:  No Known Allergies    Problem List:    Patient Active Problem List    Diagnosis Date Noted    Colon cancer (H) 04/23/2019     Priority: Medium    Anxiety state 01/25/2018     Priority: Medium    Right carpal tunnel syndrome 01/30/2015     Priority: Medium    Trigger finger, left 01/30/2015     Priority: Medium        Past Medical History:    Past Medical History:   Diagnosis Date    Colonic mass 04/10/2019    Injury of left forearm     Uncomplicated asthma        Past Surgical History:    Past Surgical History:   Procedure Laterality Date    COLECTOMY LEFT N/A 4/23/2019    Procedure: COLECTOMY, LEFT;  Surgeon: Alexsander Tinoco MD;  Location: [facility] OR    COLONOSCOPY  04/10/2019    sigmoid mass    COLONOSCOPY N/A 4/10/2019    colon cancer, follow up in 1 year, 4/10/20       Family History:    Family History   Problem Relation Age of Onset    Other - See Comments Mother         Emphyzema       Social History:  Marital Status:   [2]  Social History     Tobacco Use    Smoking  "status: Former     Current packs/day: 0.00     Types: Cigarettes     Quit date: 2015     Years since quittin.7    Smokeless tobacco: Current     Types: Chew   Substance Use Topics    Alcohol use: No    Drug use: No     Types: Other     Comment: Drug use: No        Medications:    acetaminophen (TYLENOL) 500 MG tablet  aspirin-acetaminophen-caffeine (EXCEDRIN MIGRAINE) 250-250-65 MG tablet  ibuprofen (ADVIL/MOTRIN) 200 MG tablet  levofloxacin (LEVAQUIN) 500 MG tablet  oxyCODONE (ROXICODONE) 5 MG tablet          Review of Systems   Constitutional: Negative.    HENT: Negative.     Eyes: Negative.    Respiratory: Negative.     Cardiovascular: Negative.    Gastrointestinal: Negative.    Endocrine: Negative.    Genitourinary:  Positive for scrotal swelling and testicular pain.   Musculoskeletal:  Positive for back pain.       Physical Exam   BP: 146/90  Pulse: 74  Temp: 97.8  F (36.6  C)  Resp: 15  Height: 177.8 cm (5' 10\")  Weight: 114.8 kg (253 lb)  SpO2: 97 %      Physical Exam  Vitals and nursing note reviewed. Exam conducted with a chaperone present.   Constitutional:       Comments: Mild distress secondary to low back pain testicular pain.   Cardiovascular:      Rate and Rhythm: Normal rate and regular rhythm.      Pulses: Normal pulses.      Heart sounds: Normal heart sounds.   Pulmonary:      Effort: Pulmonary effort is normal.      Breath sounds: Normal breath sounds.   Abdominal:      General: Abdomen is flat. Bowel sounds are normal.      Palpations: Abdomen is soft.      Hernia: There is no hernia in the left inguinal area or right inguinal area.   Genitourinary:     Penis: Normal.       Testes:         Right: Testicular hydrocele present. Tenderness or swelling not present.         Left: Tenderness, swelling and testicular hydrocele present.      Epididymis:      Right: Normal.      Left: Normal.   Musculoskeletal:         General: Tenderness present. No swelling.      Cervical back: Normal range of " motion and neck supple.      Lumbar back: Spasms and tenderness present. Decreased range of motion. Negative right straight leg raise test and negative left straight leg raise test.        Back:    Lymphadenopathy:      Lower Body: No right inguinal adenopathy. No left inguinal adenopathy.   Neurological:      Mental Status: He is alert.         ED Course        Procedures  Narrative & Impression  PROCEDURE: US TESTICULAR AND SCROTUM WITH DOPPLER LIMITED  9/11/2024 2:13 PM     HISTORY: Male, age 49 years, bilateral pain and swelling. .     Technique: Color, grayscale, duplex/Doppler ultrasound of the scrotum  was performed.     COMPARISON: No relevant prior imaging.     FINDINGS:      MEASUREMENTS:   Right testis: 5.4 x 3.2 x 3.1 cm (Length x AP x Width)  Left testis: 5.7 x 3.4 x 3.3 cm (Length x AP x Width)     TESTES: The right and left testicles are normal in appearance. There  is mild hyperemia of the right testicle.     EPIDIDYMIDES: Unremarkable.     OTHER: Large bilateral hydroceles, greater in volume on the left.                                                                      IMPRESSION: Right-sided orchitis without evidence of torsion.     Large bilateral hydroceles, greater in volume on the left.     ASIF CRUZ MD         SYSTEM ID:  [id number]           Exam Ended: 09/11/24  2:13 PM Last Resulted: 09/11/24  2:19 PM                          No results found for this or any previous visit (from the past 24 hour(s)).    Medications   ketorolac (TORADOL) injection 30 mg (30 mg Intramuscular $Given 9/11/24 1411)       Assessments & Plan (with Medical Decision Making)     I have reviewed the nursing notes.    I have reviewed the findings, diagnosis, plan and need for follow up with the patient.          Medical Decision Making  49-year-old male presents the urgent care mild distress secondary to left low back pain that is now kind of improved but is radiating into the left testicle.  Patient does not  have a history of testicular issues nor really significant low back problems he was lifting a couch on last Friday when he felt kind of a little twinge and then over the weekend, got better but then Monday restarted after he was moving furniture again his testicle feels very swollen it is painful tender.  He is been taking some Tylenol ibuprofen.  Examination does show some increased swelling of the left testicle and painful to palpate.  I do not feel a hernia he does have a little bit of low back discomfort with flexion extension range of motion.  No signs of sciatica straight leg raise negative no concerns about cauda equina or radiculopathy.  Patient was given Toradol 30 mg IM and he was sent for an ultrasound of the scrotum.  The testicular ultrasound does show right orchitis bilateral large hydroceles but no signs of torsion.  Did feel on examination there was a hydrocele.  Patient was started on Levaquin 500 mg daily for 10 days for the orchitis.  The hydroceles can be managed with increased anti-inflammatories I would recommend ibuprofen 800 mg every 6-8 hours icing rest good scrotal support.  He can also use the anti-inflammatories for his low back pain he can do some icing and rest.  Workability was sent with patient.  If symptoms worsen or change please return back to the urgent care or ER for further evaluation discharged in stable condition.  Discharge Medication List as of 9/11/2024  2:53 PM        START taking these medications    Details   levofloxacin (LEVAQUIN) 500 MG tablet Take 1 tablet (500 mg) by mouth daily for 10 days., Disp-10 tablet, R-0, E-Prescribe             Final diagnoses:   Orchitis of right testicle   Hydrocele, left       9/11/2024   [facility]       Amira Lima PA-C  09/12/24 [id number]

## 2024-09-17 ENCOUNTER — OFFICE VISIT (OUTPATIENT)
Dept: UROLOGY | Facility: OTHER | Age: 49
End: 2024-09-17
Attending: UROLOGY

## 2024-09-17 ENCOUNTER — LAB (OUTPATIENT)
Dept: LAB | Facility: OTHER | Age: 49
End: 2024-09-17

## 2024-09-17 VITALS — OXYGEN SATURATION: 98 % | SYSTOLIC BLOOD PRESSURE: 128 MMHG | DIASTOLIC BLOOD PRESSURE: 80 MMHG | HEART RATE: 92 BPM

## 2024-09-17 DIAGNOSIS — M54.50 ACUTE LEFT-SIDED LOW BACK PAIN WITHOUT SCIATICA: ICD-10-CM

## 2024-09-17 DIAGNOSIS — N43.3 HYDROCELE IN ADULT: ICD-10-CM

## 2024-09-17 DIAGNOSIS — N45.2 ORCHITIS: ICD-10-CM

## 2024-09-17 LAB
ALBUMIN UR-MCNC: NEGATIVE MG/DL
APPEARANCE UR: CLEAR
BILIRUB UR QL STRIP: NEGATIVE
COLOR UR AUTO: ABNORMAL
GLUCOSE UR STRIP-MCNC: NEGATIVE MG/DL
HGB UR QL STRIP: NEGATIVE
KETONES UR STRIP-MCNC: NEGATIVE MG/DL
LEUKOCYTE ESTERASE UR QL STRIP: NEGATIVE
MUCOUS THREADS #/AREA URNS LPF: PRESENT /LPF
NITRATE UR QL: NEGATIVE
PH UR STRIP: 5.5 [PH] (ref 4.7–8)
RBC URINE: 1 /HPF
SP GR UR STRIP: 1.03 (ref 1–1.03)
SQUAMOUS EPITHELIAL: 2 /HPF
UROBILINOGEN UR STRIP-MCNC: NORMAL MG/DL
WBC URINE: <1 /HPF

## 2024-09-17 PROCEDURE — 81001 URINALYSIS AUTO W/SCOPE: CPT | Mod: ZL

## 2024-09-17 PROCEDURE — 99203 OFFICE O/P NEW LOW 30 MIN: CPT | Performed by: UROLOGY

## 2024-09-17 ASSESSMENT — PAIN SCALES - GENERAL: PAINLEVEL: MODERATE PAIN (4)

## 2024-09-17 NOTE — PROGRESS NOTES
HPI:    Sunday Ramírez is a 49 year old gentleman seen today for evaluation of testicular pain, hydrocele.  He is seen at the request of the emergency department.    He presented to the ER on 24 with complaint of left low back pain and testicular pain and swelling.  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 It worsened over a few days and the testicle then became swollen and tender and this prompted an ER visit.  He was given a prescription for Levaquin and oxycodone.    Today, he reports that things are much better than he was when he went to the ER.  He had a couple of days mostly just in bed, has also been using ice.  He is almost out of the antibiotic.  Taking tylenol and ibuprofen.    He asks about kidney stones, as he read that they can cause testicle pain.  He has no history of stones.  He denies any urinary symptoms or difficulties.  No gross hematuria.      He has never had an episode like this in the past.      ROS:   10 point review of systems performed.  Pertinent positives and negatives in HPI.    Past Medical History:   Diagnosis Date    Colonic mass 04/10/2019    Injury of left forearm     04    Uncomplicated asthma     Hx of asthma       Past Surgical History:   Procedure Laterality Date    COLECTOMY LEFT N/A 2019    Procedure: COLECTOMY, LEFT;  Surgeon: Alexsander Tinoco MD;  Location: [facility] OR    COLONOSCOPY  04/10/2019    sigmoid mass    COLONOSCOPY N/A 4/10/2019    colon cancer, follow up in 1 year, 4/10/20       Family History   Problem Relation Age of Onset    Other - See Comments Mother         Emphyzema        Social History     Tobacco Use    Smoking status: Former     Current packs/day: 0.00     Types: Cigarettes     Quit date: 2015     Years since quittin.7    Smokeless tobacco: Current     Types: Chew   Substance Use Topics    Alcohol use: No    Drug use: No     Types: Other     Comment: Drug use: No        Current Outpatient Medications   Medication Sig  Dispense Refill    acetaminophen (TYLENOL) 500 MG tablet Take 500-1,000 mg by mouth every 6 hours as needed for mild pain      aspirin-acetaminophen-caffeine (EXCEDRIN MIGRAINE) 250-250-65 MG tablet Take 1 tablet by mouth every 6 hours as needed for headaches      ibuprofen (ADVIL/MOTRIN) 200 MG tablet Take 200 mg by mouth every 6 hours as needed for mild pain      levofloxacin (LEVAQUIN) 500 MG tablet Take 1 tablet (500 mg) by mouth daily for 10 days. 10 tablet 0    oxyCODONE (ROXICODONE) 5 MG tablet Take 1 tablet (5 mg) by mouth every 6 hours as needed for severe pain 6 tablet 0     No current facility-administered medications for this visit.       Exam:  /80 (BP Location: Right arm, Patient Position: Sitting)   Pulse 92   SpO2 98%     Gen: Pleasant, NAD  HEENT: WNL  Resp: nonlabored on RA  Abd: soft, ND, NT to palpation  : Normal penis with orthotopic meatus.  No shaft lesions or plaques.  Scrotum is visibly swollen, left greater than right side.  Left testicle is not palpable due to the large hydrocele.  Cord is nontender.  No hernia.  No skin changes.  Slight hydrocele on right side, testicle palpable and normal.  Epididymis, cord normal on right.  No hernias.  Back: Tenderness to palpation of left sided QL/paraspinous muscles.    Results/Data:  UA pending    Scrotal US 9/11/24  OTHER: Large bilateral hydroceles, greater in volume on the left.                                                            IMPRESSION: Right-sided orchitis without evidence of torsion.   Large bilateral hydroceles, greater in volume on the left.    Assessment and Plan:    Sunday Ramírez is a 49 year old gentleman see today for the following:      Hydrocele in adult  Orchitis  Acute left-sided low back pain without sciatica    We discussed that his testicular pain is likely musculoskeletal in nature, less likely infectious.  Still, it is prudent to finish his antibiotic course as prescribed.  His symptoms are improving  significantly.  I recommended continuing with activity limitation, ice, ibuprofen as needed.    I believe he would benefit from physical therapy focused on the low back and pelvic muscles.  A referral will be placed.    We discussed that hydroceles are benign, and usually not addressed surgically unless they become very large in size and cause discomfort due to that.  I will plan to see him back in about 3 months to recheck his hydrocele and determine whether further follow-up is needed at that time.      If he were to have recurrent testicular pain, I recommended ibuprofen, activity limitation, and ice.  If symptoms persist, he should call the clinic.

## 2024-11-03 ENCOUNTER — HEALTH MAINTENANCE LETTER (OUTPATIENT)
Age: 49
End: 2024-11-03

## 2025-09-02 ENCOUNTER — OFFICE VISIT (OUTPATIENT)
Dept: UROLOGY | Facility: OTHER | Age: 50
End: 2025-09-02
Attending: UROLOGY
Payer: COMMERCIAL

## 2025-09-02 ENCOUNTER — PREP FOR PROCEDURE (OUTPATIENT)
Dept: UROLOGY | Facility: OTHER | Age: 50
End: 2025-09-02

## 2025-09-02 VITALS — DIASTOLIC BLOOD PRESSURE: 78 MMHG | SYSTOLIC BLOOD PRESSURE: 110 MMHG | HEART RATE: 80 BPM | OXYGEN SATURATION: 97 %

## 2025-09-02 DIAGNOSIS — N43.3 HYDROCELE IN ADULT: Primary | ICD-10-CM

## 2025-09-02 DIAGNOSIS — N43.3 HYDROCELE: Primary | ICD-10-CM

## 2025-09-02 ASSESSMENT — PAIN SCALES - GENERAL: PAINLEVEL_OUTOF10: NO PAIN (0)

## (undated) DEVICE — ENDO TROCAR FIRST ENTRY KII FIOS Z-THRD 12X100MM CTF73

## (undated) DEVICE — ESU HOLDER LAP INST DISP PURPLE LONG 330MM H-PRO-330

## (undated) DEVICE — ESU GROUND PAD ADULT W/CORD E7507

## (undated) DEVICE — COVER LIGHT HANDLE LT-F02

## (undated) DEVICE — KIT PATIENT POSITIONING PIGAZZI LATEX FREE 40580

## (undated) DEVICE — ENDO BRUSH CHANNEL MASTER CLEANING 2-4.2MM BW-412T

## (undated) DEVICE — SUCTION MANIFOLD NEPTUNE 2 SYS 4 PORT 0702-020-000

## (undated) DEVICE — NDL-INSUFFLATION 120MM

## (undated) DEVICE — ENDO FORCEP ENDOJAW BIOPSY 2.8MMX230CM FB-220U

## (undated) DEVICE — TUBING SUCTION 10'X3/16" N510

## (undated) DEVICE — DRSG MEDIPORE 2X2 3/4" 3562

## (undated) DEVICE — DRSG MEDIPORE 3 1/2X8" 3570

## (undated) DEVICE — TUBING INSUFFLATOR W/FILTER OLYMPUS WA95005A

## (undated) DEVICE — SU SILK 2-0 SH 30" K833H

## (undated) DEVICE — SYR 10ML LL W/O NDL

## (undated) DEVICE — PACK LAPAROSCOPY LF SBA15LPFCA

## (undated) DEVICE — ENDO TROCAR SLEEVE KII 5X100MM CTR03

## (undated) DEVICE — Device

## (undated) DEVICE — SUCTION STRYKERFLOW II 250-070-500

## (undated) DEVICE — SPECIMEN CONTAINER URINE STERILE DYND30331

## (undated) DEVICE — NDL ECLIPSE 25GA 1.5"

## (undated) DEVICE — ADH LIQUID MASTISOL TOPICAL VIAL 2-3ML 0523-48

## (undated) DEVICE — SLEEVE COMPRESSION SCD KNEE MED 74022

## (undated) DEVICE — ESU CORD MONOPOLAR 10'  E0510

## (undated) DEVICE — SU VICRYL 3-0 SH 27" UND J416H

## (undated) DEVICE — SUTURE 2-0 VICRYL TIES J911T

## (undated) DEVICE — DRSG STERI STRIP 1/2X4" R1547

## (undated) DEVICE — STPL LINEAR CUT 75MM TLC75

## (undated) DEVICE — SU MONOCRYL 4-0 PS-2 27" UND Y426H

## (undated) DEVICE — GLOVE BIOGEL PI INDICATOR 8.0 LF 41680

## (undated) DEVICE — SUCTION TIP YANKAUER W/O VENT K86

## (undated) DEVICE — SPONGE LAP 18X18" 23250-400

## (undated) DEVICE — NDL COUNTER BLADE GUARD II 15/30 31142295

## (undated) DEVICE — LUBRICATING JELLY 2.7GM T00137

## (undated) DEVICE — ESU PENCIL W/SMOKE EVAC CVPLP2000

## (undated) DEVICE — SUCTION TIP YANKAUER W/O VENT BULBOUS TIP

## (undated) DEVICE — ESU ELEC BLADE 6" COATED E1450-6

## (undated) DEVICE — PREP SKIN SCRUB TRAY 4461A

## (undated) DEVICE — SU PDS II 0 CTX 60" Z990G

## (undated) DEVICE — STPL CIRCULAR 33MM CVD CDH33A

## (undated) DEVICE — SU PROLENE 3-0 SHDA 36" 8522H

## (undated) DEVICE — SOL WATER 1500ML

## (undated) DEVICE — SYR ENDO MARKER SPOT GI 5ML GIS-45

## (undated) DEVICE — STPL RELOAD LINEAR CUT 75MM TCR75

## (undated) DEVICE — ENDO NDL INJECTION DISP NM-200U-0423

## (undated) DEVICE — TRAY 16FR CATH W/URINE METER SILVER 903116

## (undated) DEVICE — ESU LIGASURE LAPAROSCPC L-HOOK SEALER/DVDR 5MM-44CM LF5644

## (undated) DEVICE — PREP CHLORAPREP 26ML TINTED ORANGE  260815

## (undated) DEVICE — FASTENER LEGBAND CATHETER DALE 316

## (undated) DEVICE — LEGGINGS 31X48" LF KM89408

## (undated) DEVICE — ENDO TROCAR SLEEVE KII Z-THREADED 05X100MM CTS02

## (undated) DEVICE — GOWN XLG/LONG ISOLATION MICROCOOL DISP 92353

## (undated) DEVICE — DRSG GAUZE 4X4" 3033

## (undated) DEVICE — SU VICRYL 3-0 SH 27" J784G

## (undated) DEVICE — GLOVE PROTEXIS POWDER FREE SMT 7.5  2D72PT75X

## (undated) DEVICE — GOWN XLG ISOLATION MICROCOOL DISP 3201PG

## (undated) DEVICE — ENDO KIT COMPLIANCE DYKENDOCMPLY

## (undated) RX ORDER — ONDANSETRON 2 MG/ML
INJECTION INTRAMUSCULAR; INTRAVENOUS
Status: DISPENSED
Start: 2019-04-23

## (undated) RX ORDER — LIDOCAINE HYDROCHLORIDE 10 MG/ML
INJECTION, SOLUTION EPIDURAL; INFILTRATION; INTRACAUDAL; PERINEURAL
Status: DISPENSED
Start: 2019-04-23

## (undated) RX ORDER — NEOSTIGMINE METHYLSULFATE 0.5 MG/ML
INJECTION INTRAVENOUS
Status: DISPENSED
Start: 2019-04-23

## (undated) RX ORDER — SODIUM CHLORIDE, SODIUM LACTATE, POTASSIUM CHLORIDE, CALCIUM CHLORIDE 600; 310; 30; 20 MG/100ML; MG/100ML; MG/100ML; MG/100ML
INJECTION, SOLUTION INTRAVENOUS
Status: DISPENSED
Start: 2019-04-23

## (undated) RX ORDER — KETOROLAC TROMETHAMINE 30 MG/ML
INJECTION, SOLUTION INTRAMUSCULAR; INTRAVENOUS
Status: DISPENSED
Start: 2019-04-23

## (undated) RX ORDER — ACETAMINOPHEN 325 MG/1
TABLET ORAL
Status: DISPENSED
Start: 2019-04-23

## (undated) RX ORDER — DEXAMETHASONE SODIUM PHOSPHATE 4 MG/ML
INJECTION, SOLUTION INTRA-ARTICULAR; INTRALESIONAL; INTRAMUSCULAR; INTRAVENOUS; SOFT TISSUE
Status: DISPENSED
Start: 2019-04-23

## (undated) RX ORDER — FENTANYL CITRATE 50 UG/ML
INJECTION, SOLUTION INTRAMUSCULAR; INTRAVENOUS
Status: DISPENSED
Start: 2019-04-23

## (undated) RX ORDER — PROPOFOL 10 MG/ML
INJECTION, EMULSION INTRAVENOUS
Status: DISPENSED
Start: 2019-04-23

## (undated) RX ORDER — GLYCOPYRROLATE 0.2 MG/ML
INJECTION, SOLUTION INTRAMUSCULAR; INTRAVENOUS
Status: DISPENSED
Start: 2019-04-23

## (undated) RX ORDER — BUPIVACAINE HYDROCHLORIDE AND EPINEPHRINE 5; 5 MG/ML; UG/ML
INJECTION, SOLUTION EPIDURAL; INTRACAUDAL; PERINEURAL
Status: DISPENSED
Start: 2019-04-23

## (undated) RX ORDER — KETAMINE HYDROCHLORIDE 50 MG/ML
INJECTION, SOLUTION INTRAMUSCULAR; INTRAVENOUS
Status: DISPENSED
Start: 2019-04-23

## (undated) RX ORDER — LIDOCAINE HYDROCHLORIDE 20 MG/ML
INJECTION, SOLUTION EPIDURAL; INFILTRATION; INTRACAUDAL; PERINEURAL
Status: DISPENSED
Start: 2019-04-23

## (undated) RX ORDER — HYDROMORPHONE HYDROCHLORIDE 2 MG/ML
INJECTION, SOLUTION INTRAMUSCULAR; INTRAVENOUS; SUBCUTANEOUS
Status: DISPENSED
Start: 2019-04-23

## (undated) RX ORDER — MORPHINE SULFATE 0.5 MG/ML
INJECTION, SOLUTION EPIDURAL; INTRATHECAL; INTRAVENOUS
Status: DISPENSED
Start: 2019-04-23